# Patient Record
Sex: FEMALE | Race: WHITE | NOT HISPANIC OR LATINO | Employment: OTHER | URBAN - METROPOLITAN AREA
[De-identification: names, ages, dates, MRNs, and addresses within clinical notes are randomized per-mention and may not be internally consistent; named-entity substitution may affect disease eponyms.]

---

## 2024-01-01 ENCOUNTER — APPOINTMENT (OUTPATIENT)
Dept: CARDIOLOGY | Facility: HOSPITAL | Age: 74
DRG: 283 | End: 2024-01-01
Payer: MEDICARE

## 2024-01-01 ENCOUNTER — APPOINTMENT (OUTPATIENT)
Dept: RADIOLOGY | Facility: HOSPITAL | Age: 74
DRG: 283 | End: 2024-01-01
Payer: MEDICARE

## 2024-01-01 ENCOUNTER — APPOINTMENT (OUTPATIENT)
Dept: NEUROLOGY | Facility: HOSPITAL | Age: 74
DRG: 283 | End: 2024-01-01
Payer: MEDICARE

## 2024-01-01 PROCEDURE — 71045 X-RAY EXAM CHEST 1 VIEW: CPT

## 2024-01-01 PROCEDURE — 70498 CT ANGIOGRAPHY NECK: CPT

## 2024-01-01 PROCEDURE — 93306 TTE W/DOPPLER COMPLETE: CPT | Performed by: INTERNAL MEDICINE

## 2024-01-01 PROCEDURE — 93005 ELECTROCARDIOGRAM TRACING: CPT

## 2024-01-01 PROCEDURE — 95819 EEG AWAKE AND ASLEEP: CPT

## 2024-01-01 PROCEDURE — 93306 TTE W/DOPPLER COMPLETE: CPT

## 2024-06-03 ENCOUNTER — HOSPITAL ENCOUNTER (INPATIENT)
Facility: HOSPITAL | Age: 74
LOS: 2 days | Discharge: OTHER INSTITUTION | DRG: 280 | End: 2024-06-06
Attending: STUDENT IN AN ORGANIZED HEALTH CARE EDUCATION/TRAINING PROGRAM | Admitting: INTERNAL MEDICINE
Payer: MEDICARE

## 2024-06-03 ENCOUNTER — APPOINTMENT (OUTPATIENT)
Dept: RADIOLOGY | Facility: HOSPITAL | Age: 74
DRG: 280 | End: 2024-06-03
Payer: MEDICARE

## 2024-06-03 DIAGNOSIS — I21.3 ST ELEVATION MYOCARDIAL INFARCTION (STEMI), UNSPECIFIED ARTERY (MULTI): Primary | ICD-10-CM

## 2024-06-03 DIAGNOSIS — I21.3 STEMI (ST ELEVATION MYOCARDIAL INFARCTION) (MULTI): ICD-10-CM

## 2024-06-03 DIAGNOSIS — I21.29 ST ELEVATION (STEMI) MYOCARDIAL INFARCTION INVOLVING OTHER SITES (MULTI): ICD-10-CM

## 2024-06-03 LAB — GLUCOSE BLD MANUAL STRIP-MCNC: 171 MG/DL (ref 74–99)

## 2024-06-03 PROCEDURE — 70450 CT HEAD/BRAIN W/O DYE: CPT

## 2024-06-03 PROCEDURE — 93010 ELECTROCARDIOGRAM REPORT: CPT | Performed by: INTERNAL MEDICINE

## 2024-06-03 PROCEDURE — 82947 ASSAY GLUCOSE BLOOD QUANT: CPT

## 2024-06-03 PROCEDURE — 99291 CRITICAL CARE FIRST HOUR: CPT

## 2024-06-04 PROBLEM — I21.3 STEMI (ST ELEVATION MYOCARDIAL INFARCTION) (MULTI): Status: ACTIVE | Noted: 2024-06-04

## 2024-06-04 LAB
ALBUMIN SERPL-MCNC: 3.3 G/DL (ref 3.5–5)
ALBUMIN SERPL-MCNC: 3.4 G/DL (ref 3.5–5)
ALP BLD-CCNC: 100 U/L (ref 35–125)
ALP BLD-CCNC: 94 U/L (ref 35–125)
ALT SERPL-CCNC: 155 U/L (ref 5–40)
ALT SERPL-CCNC: 165 U/L (ref 5–40)
ANION GAP SERPL CALC-SCNC: 12 MMOL/L
ANION GAP SERPL CALC-SCNC: 15 MMOL/L
AORTIC VALVE MEAN GRADIENT: 3.5 MMHG
AORTIC VALVE PEAK VELOCITY: 1.27 M/S
APTT PPP: 24.5 SECONDS (ref 22–32.5)
AST SERPL-CCNC: 147 U/L (ref 5–40)
AST SERPL-CCNC: 159 U/L (ref 5–40)
ATRIAL RATE: 81 BPM
AV PEAK GRADIENT: 6.4 MMHG
AVA (PEAK VEL): 1.39 CM2
AVA (VTI): 1.57 CM2
BASOPHILS # BLD AUTO: 0.02 X10*3/UL (ref 0–0.1)
BASOPHILS # BLD AUTO: 0.03 X10*3/UL (ref 0–0.1)
BASOPHILS NFR BLD AUTO: 0.2 %
BASOPHILS NFR BLD AUTO: 0.3 %
BILIRUB SERPL-MCNC: 0.4 MG/DL (ref 0.1–1.2)
BILIRUB SERPL-MCNC: 0.5 MG/DL (ref 0.1–1.2)
BUN SERPL-MCNC: 36 MG/DL (ref 8–25)
BUN SERPL-MCNC: 39 MG/DL (ref 8–25)
CALCIUM SERPL-MCNC: 8.9 MG/DL (ref 8.5–10.4)
CALCIUM SERPL-MCNC: 9.1 MG/DL (ref 8.5–10.4)
CHLORIDE SERPL-SCNC: 104 MMOL/L (ref 97–107)
CHLORIDE SERPL-SCNC: 99 MMOL/L (ref 97–107)
CHOLEST SERPL-MCNC: 111 MG/DL (ref 133–200)
CHOLEST/HDLC SERPL: 2.9 {RATIO}
CO2 SERPL-SCNC: 22 MMOL/L (ref 24–31)
CO2 SERPL-SCNC: 24 MMOL/L (ref 24–31)
CREAT SERPL-MCNC: 0.9 MG/DL (ref 0.4–1.6)
CREAT SERPL-MCNC: 1.1 MG/DL (ref 0.4–1.6)
EGFRCR SERPLBLD CKD-EPI 2021: 52 ML/MIN/1.73M*2
EGFRCR SERPLBLD CKD-EPI 2021: 66 ML/MIN/1.73M*2
EJECTION FRACTION APICAL 4 CHAMBER: 54.1
EOSINOPHIL # BLD AUTO: 0.01 X10*3/UL (ref 0–0.4)
EOSINOPHIL # BLD AUTO: 0.05 X10*3/UL (ref 0–0.4)
EOSINOPHIL NFR BLD AUTO: 0.1 %
EOSINOPHIL NFR BLD AUTO: 0.5 %
ERYTHROCYTE [DISTWIDTH] IN BLOOD BY AUTOMATED COUNT: 15.7 % (ref 11.5–14.5)
ERYTHROCYTE [DISTWIDTH] IN BLOOD BY AUTOMATED COUNT: 16.1 % (ref 11.5–14.5)
EST. AVERAGE GLUCOSE BLD GHB EST-MCNC: 100 MG/DL
GLUCOSE BLD MANUAL STRIP-MCNC: 100 MG/DL (ref 74–99)
GLUCOSE BLD MANUAL STRIP-MCNC: 108 MG/DL (ref 74–99)
GLUCOSE BLD MANUAL STRIP-MCNC: 122 MG/DL (ref 74–99)
GLUCOSE BLD MANUAL STRIP-MCNC: 135 MG/DL (ref 74–99)
GLUCOSE SERPL-MCNC: 131 MG/DL (ref 65–99)
GLUCOSE SERPL-MCNC: 162 MG/DL (ref 65–99)
HBA1C MFR BLD: 5.1 %
HCT VFR BLD AUTO: 29.6 % (ref 36–46)
HCT VFR BLD AUTO: 32.1 % (ref 36–46)
HDLC SERPL-MCNC: 38 MG/DL
HGB BLD-MCNC: 10.1 G/DL (ref 12–16)
HGB BLD-MCNC: 9.5 G/DL (ref 12–16)
IMM GRANULOCYTES # BLD AUTO: 0.1 X10*3/UL (ref 0–0.5)
IMM GRANULOCYTES # BLD AUTO: 0.12 X10*3/UL (ref 0–0.5)
IMM GRANULOCYTES NFR BLD AUTO: 1 % (ref 0–0.9)
IMM GRANULOCYTES NFR BLD AUTO: 1.1 % (ref 0–0.9)
INR PPP: 1.1 (ref 0.9–1.2)
LDLC SERPL CALC-MCNC: 51 MG/DL (ref 65–130)
LEFT VENTRICLE INTERNAL DIMENSION DIASTOLE: 4.02 CM (ref 3.5–6)
LEFT VENTRICULAR OUTFLOW TRACT DIAMETER: 1.9 CM
LV EJECTION FRACTION BIPLANE: 45 %
LYMPHOCYTES # BLD AUTO: 0.36 X10*3/UL (ref 0.8–3)
LYMPHOCYTES # BLD AUTO: 0.65 X10*3/UL (ref 0.8–3)
LYMPHOCYTES NFR BLD AUTO: 3.2 %
LYMPHOCYTES NFR BLD AUTO: 6.6 %
MAGNESIUM SERPL-MCNC: 2.3 MG/DL (ref 1.6–3.1)
MCH RBC QN AUTO: 31.8 PG (ref 26–34)
MCH RBC QN AUTO: 32.5 PG (ref 26–34)
MCHC RBC AUTO-ENTMCNC: 31.5 G/DL (ref 32–36)
MCHC RBC AUTO-ENTMCNC: 32.1 G/DL (ref 32–36)
MCV RBC AUTO: 103 FL (ref 80–100)
MCV RBC AUTO: 99 FL (ref 80–100)
MITRAL VALVE E/A RATIO: 41.95
MONOCYTES # BLD AUTO: 0.67 X10*3/UL (ref 0.05–0.8)
MONOCYTES # BLD AUTO: 0.75 X10*3/UL (ref 0.05–0.8)
MONOCYTES NFR BLD AUTO: 5.9 %
MONOCYTES NFR BLD AUTO: 7.6 %
NEUTROPHILS # BLD AUTO: 10.2 X10*3/UL (ref 1.6–5.5)
NEUTROPHILS # BLD AUTO: 8.33 X10*3/UL (ref 1.6–5.5)
NEUTROPHILS NFR BLD AUTO: 84 %
NEUTROPHILS NFR BLD AUTO: 89.5 %
NRBC BLD-RTO: 0 /100 WBCS (ref 0–0)
NRBC BLD-RTO: 0.2 /100 WBCS (ref 0–0)
PHOSPHATE SERPL-MCNC: 3.7 MG/DL (ref 2.5–4.5)
PLATELET # BLD AUTO: 159 X10*3/UL (ref 150–450)
PLATELET # BLD AUTO: 176 X10*3/UL (ref 150–450)
POTASSIUM SERPL-SCNC: 3.6 MMOL/L (ref 3.4–5.1)
POTASSIUM SERPL-SCNC: 3.8 MMOL/L (ref 3.4–5.1)
PROT SERPL-MCNC: 5.7 G/DL (ref 5.9–7.9)
PROT SERPL-MCNC: 5.9 G/DL (ref 5.9–7.9)
PROTHROMBIN TIME: 11.9 SECONDS (ref 9.3–12.7)
Q ONSET: 204 MS
Q ONSET: 205 MS
QRS COUNT: 13 BEATS
QRS COUNT: 18 BEATS
QRS DURATION: 124 MS
QRS DURATION: 134 MS
QT INTERVAL: 376 MS
QT INTERVAL: 442 MS
QTC CALCULATION(BAZETT): 506 MS
QTC CALCULATION(BAZETT): 513 MS
QTC FREDERICIA: 458 MS
QTC FREDERICIA: 488 MS
R AXIS: 86 DEGREES
R AXIS: 89 DEGREES
RBC # BLD AUTO: 2.99 X10*6/UL (ref 4–5.2)
RBC # BLD AUTO: 3.11 X10*6/UL (ref 4–5.2)
RIGHT VENTRICLE PEAK SYSTOLIC PRESSURE: 30.7 MMHG
SODIUM SERPL-SCNC: 135 MMOL/L (ref 133–145)
SODIUM SERPL-SCNC: 141 MMOL/L (ref 133–145)
T AXIS: -88 DEGREES
T AXIS: -89 DEGREES
T OFFSET: 393 MS
T OFFSET: 425 MS
TRIGL SERPL-MCNC: 109 MG/DL (ref 40–150)
TROPONIN T SERPL-MCNC: 8151 NG/L
VENTRICULAR RATE: 109 BPM
VENTRICULAR RATE: 81 BPM
WBC # BLD AUTO: 11.4 X10*3/UL (ref 4.4–11.3)
WBC # BLD AUTO: 9.9 X10*3/UL (ref 4.4–11.3)

## 2024-06-04 PROCEDURE — 93454 CORONARY ARTERY ANGIO S&I: CPT | Performed by: INTERNAL MEDICINE

## 2024-06-04 PROCEDURE — C1769 GUIDE WIRE: HCPCS | Performed by: INTERNAL MEDICINE

## 2024-06-04 PROCEDURE — 96374 THER/PROPH/DIAG INJ IV PUSH: CPT | Mod: 59

## 2024-06-04 PROCEDURE — 2500000004 HC RX 250 GENERAL PHARMACY W/ HCPCS (ALT 636 FOR OP/ED): Performed by: INTERNAL MEDICINE

## 2024-06-04 PROCEDURE — 80053 COMPREHEN METABOLIC PANEL: CPT | Mod: 91,MUE

## 2024-06-04 PROCEDURE — 99291 CRITICAL CARE FIRST HOUR: CPT

## 2024-06-04 PROCEDURE — 84100 ASSAY OF PHOSPHORUS: CPT

## 2024-06-04 PROCEDURE — 99152 MOD SED SAME PHYS/QHP 5/>YRS: CPT | Performed by: INTERNAL MEDICINE

## 2024-06-04 PROCEDURE — 85730 THROMBOPLASTIN TIME PARTIAL: CPT | Performed by: STUDENT IN AN ORGANIZED HEALTH CARE EDUCATION/TRAINING PROGRAM

## 2024-06-04 PROCEDURE — 2500000004 HC RX 250 GENERAL PHARMACY W/ HCPCS (ALT 636 FOR OP/ED)

## 2024-06-04 PROCEDURE — 83036 HEMOGLOBIN GLYCOSYLATED A1C: CPT

## 2024-06-04 PROCEDURE — 2720000007 HC OR 272 NO HCPCS: Performed by: INTERNAL MEDICINE

## 2024-06-04 PROCEDURE — 70496 CT ANGIOGRAPHY HEAD: CPT | Performed by: STUDENT IN AN ORGANIZED HEALTH CARE EDUCATION/TRAINING PROGRAM

## 2024-06-04 PROCEDURE — 85610 PROTHROMBIN TIME: CPT | Performed by: STUDENT IN AN ORGANIZED HEALTH CARE EDUCATION/TRAINING PROGRAM

## 2024-06-04 PROCEDURE — 85025 COMPLETE CBC W/AUTO DIFF WBC: CPT | Mod: 91

## 2024-06-04 PROCEDURE — 71045 X-RAY EXAM CHEST 1 VIEW: CPT | Performed by: RADIOLOGY

## 2024-06-04 PROCEDURE — 93010 ELECTROCARDIOGRAM REPORT: CPT | Performed by: INTERNAL MEDICINE

## 2024-06-04 PROCEDURE — 85025 COMPLETE CBC W/AUTO DIFF WBC: CPT | Performed by: STUDENT IN AN ORGANIZED HEALTH CARE EDUCATION/TRAINING PROGRAM

## 2024-06-04 PROCEDURE — 99233 SBSQ HOSP IP/OBS HIGH 50: CPT

## 2024-06-04 PROCEDURE — 2500000004 HC RX 250 GENERAL PHARMACY W/ HCPCS (ALT 636 FOR OP/ED): Performed by: STUDENT IN AN ORGANIZED HEALTH CARE EDUCATION/TRAINING PROGRAM

## 2024-06-04 PROCEDURE — 92610 EVALUATE SWALLOWING FUNCTION: CPT | Mod: GN | Performed by: SPEECH-LANGUAGE PATHOLOGIST

## 2024-06-04 PROCEDURE — 36415 COLL VENOUS BLD VENIPUNCTURE: CPT

## 2024-06-04 PROCEDURE — 80061 LIPID PANEL: CPT

## 2024-06-04 PROCEDURE — 92523 SPEECH SOUND LANG COMPREHEN: CPT | Mod: GN | Performed by: SPEECH-LANGUAGE PATHOLOGIST

## 2024-06-04 PROCEDURE — 97162 PT EVAL MOD COMPLEX 30 MIN: CPT | Mod: GP

## 2024-06-04 PROCEDURE — 2500000001 HC RX 250 WO HCPCS SELF ADMINISTERED DRUGS (ALT 637 FOR MEDICARE OP): Performed by: STUDENT IN AN ORGANIZED HEALTH CARE EDUCATION/TRAINING PROGRAM

## 2024-06-04 PROCEDURE — 70498 CT ANGIOGRAPHY NECK: CPT | Performed by: STUDENT IN AN ORGANIZED HEALTH CARE EDUCATION/TRAINING PROGRAM

## 2024-06-04 PROCEDURE — 97110 THERAPEUTIC EXERCISES: CPT | Mod: GP

## 2024-06-04 PROCEDURE — 82947 ASSAY GLUCOSE BLOOD QUANT: CPT

## 2024-06-04 PROCEDURE — 84484 ASSAY OF TROPONIN QUANT: CPT | Performed by: STUDENT IN AN ORGANIZED HEALTH CARE EDUCATION/TRAINING PROGRAM

## 2024-06-04 PROCEDURE — 36415 COLL VENOUS BLD VENIPUNCTURE: CPT | Performed by: STUDENT IN AN ORGANIZED HEALTH CARE EDUCATION/TRAINING PROGRAM

## 2024-06-04 PROCEDURE — 80053 COMPREHEN METABOLIC PANEL: CPT | Performed by: STUDENT IN AN ORGANIZED HEALTH CARE EDUCATION/TRAINING PROGRAM

## 2024-06-04 PROCEDURE — 2550000001 HC RX 255 CONTRASTS: Performed by: INTERNAL MEDICINE

## 2024-06-04 PROCEDURE — 4A023N7 MEASUREMENT OF CARDIAC SAMPLING AND PRESSURE, LEFT HEART, PERCUTANEOUS APPROACH: ICD-10-PCS | Performed by: INTERNAL MEDICINE

## 2024-06-04 PROCEDURE — 82947 ASSAY GLUCOSE BLOOD QUANT: CPT | Mod: 91

## 2024-06-04 PROCEDURE — 2700000047 HC OR 270 NO HCPCS: Performed by: INTERNAL MEDICINE

## 2024-06-04 PROCEDURE — 2060000001 HC INTERMEDIATE ICU ROOM DAILY

## 2024-06-04 PROCEDURE — C1760 CLOSURE DEV, VASC: HCPCS | Performed by: INTERNAL MEDICINE

## 2024-06-04 PROCEDURE — 83735 ASSAY OF MAGNESIUM: CPT

## 2024-06-04 PROCEDURE — C1894 INTRO/SHEATH, NON-LASER: HCPCS | Performed by: INTERNAL MEDICINE

## 2024-06-04 PROCEDURE — 2500000005 HC RX 250 GENERAL PHARMACY W/O HCPCS: Performed by: INTERNAL MEDICINE

## 2024-06-04 PROCEDURE — 71045 X-RAY EXAM CHEST 1 VIEW: CPT | Performed by: STUDENT IN AN ORGANIZED HEALTH CARE EDUCATION/TRAINING PROGRAM

## 2024-06-04 PROCEDURE — 99221 1ST HOSP IP/OBS SF/LOW 40: CPT | Performed by: INTERNAL MEDICINE

## 2024-06-04 PROCEDURE — B2111ZZ FLUOROSCOPY OF MULTIPLE CORONARY ARTERIES USING LOW OSMOLAR CONTRAST: ICD-10-PCS | Performed by: INTERNAL MEDICINE

## 2024-06-04 PROCEDURE — 2550000001 HC RX 255 CONTRASTS: Performed by: STUDENT IN AN ORGANIZED HEALTH CARE EDUCATION/TRAINING PROGRAM

## 2024-06-04 RX ORDER — LABETALOL HYDROCHLORIDE 5 MG/ML
10 INJECTION, SOLUTION INTRAVENOUS EVERY 10 MIN PRN
Status: DISCONTINUED | OUTPATIENT
Start: 2024-06-04 | End: 2024-06-06

## 2024-06-04 RX ORDER — NAPROXEN SODIUM 220 MG/1
324 TABLET, FILM COATED ORAL ONCE
Status: DISCONTINUED | OUTPATIENT
Start: 2024-06-04 | End: 2024-06-04

## 2024-06-04 RX ORDER — NAPROXEN SODIUM 220 MG/1
81 TABLET, FILM COATED ORAL DAILY
Status: DISCONTINUED | OUTPATIENT
Start: 2024-06-04 | End: 2024-06-06

## 2024-06-04 RX ORDER — BENZONATATE 100 MG/1
100 CAPSULE ORAL EVERY 8 HOURS PRN
COMMUNITY
Start: 2023-11-30

## 2024-06-04 RX ORDER — DIAZEPAM 5 MG/ML
2 INJECTION, SOLUTION INTRAMUSCULAR; INTRAVENOUS EVERY 8 HOURS PRN
Status: COMPLETED | OUTPATIENT
Start: 2024-06-04 | End: 2024-06-05

## 2024-06-04 RX ORDER — HEPARIN SODIUM 5000 [USP'U]/ML
4000 INJECTION, SOLUTION INTRAVENOUS; SUBCUTANEOUS ONCE
Status: COMPLETED | OUTPATIENT
Start: 2024-06-04 | End: 2024-06-04

## 2024-06-04 RX ORDER — DEXTROSE 50 % IN WATER (D50W) INTRAVENOUS SYRINGE
25
Status: DISCONTINUED | OUTPATIENT
Start: 2024-06-04 | End: 2024-06-06

## 2024-06-04 RX ORDER — HYDRALAZINE HYDROCHLORIDE 20 MG/ML
10 INJECTION INTRAMUSCULAR; INTRAVENOUS
Status: DISCONTINUED | OUTPATIENT
Start: 2024-06-04 | End: 2024-06-06

## 2024-06-04 RX ORDER — LEVETIRACETAM 5 MG/ML
500 INJECTION INTRAVASCULAR EVERY 12 HOURS
Status: DISCONTINUED | OUTPATIENT
Start: 2024-06-04 | End: 2024-06-04

## 2024-06-04 RX ORDER — MIDAZOLAM HYDROCHLORIDE 1 MG/ML
INJECTION, SOLUTION INTRAMUSCULAR; INTRAVENOUS AS NEEDED
Status: DISCONTINUED | OUTPATIENT
Start: 2024-06-04 | End: 2024-06-04 | Stop reason: HOSPADM

## 2024-06-04 RX ORDER — IPRATROPIUM BROMIDE AND ALBUTEROL SULFATE 2.5; .5 MG/3ML; MG/3ML
3 SOLUTION RESPIRATORY (INHALATION) EVERY 6 HOURS PRN
COMMUNITY
Start: 2022-11-01

## 2024-06-04 RX ORDER — LEVETIRACETAM 5 MG/ML
500 INJECTION INTRAVASCULAR EVERY 12 HOURS
Status: DISCONTINUED | OUTPATIENT
Start: 2024-06-04 | End: 2024-06-07 | Stop reason: HOSPADM

## 2024-06-04 RX ORDER — IODIXANOL 320 MG/ML
INJECTION, SOLUTION INTRAVASCULAR AS NEEDED
Status: DISCONTINUED | OUTPATIENT
Start: 2024-06-04 | End: 2024-06-04 | Stop reason: HOSPADM

## 2024-06-04 RX ORDER — MIDAZOLAM HYDROCHLORIDE 1 MG/ML
0.5 INJECTION, SOLUTION INTRAMUSCULAR; INTRAVENOUS EVERY 8 HOURS PRN
Status: DISCONTINUED | OUTPATIENT
Start: 2024-06-04 | End: 2024-06-04

## 2024-06-04 RX ORDER — ENOXAPARIN SODIUM 100 MG/ML
40 INJECTION SUBCUTANEOUS EVERY 24 HOURS
Status: DISCONTINUED | OUTPATIENT
Start: 2024-06-04 | End: 2024-06-06

## 2024-06-04 RX ORDER — POLYETHYLENE GLYCOL 3350 17 G/17G
17 POWDER, FOR SOLUTION ORAL DAILY PRN
Status: DISCONTINUED | OUTPATIENT
Start: 2024-06-04 | End: 2024-06-06

## 2024-06-04 RX ORDER — LIDOCAINE HYDROCHLORIDE 10 MG/ML
INJECTION, SOLUTION EPIDURAL; INFILTRATION; INTRACAUDAL; PERINEURAL AS NEEDED
Status: DISCONTINUED | OUTPATIENT
Start: 2024-06-04 | End: 2024-06-04 | Stop reason: HOSPADM

## 2024-06-04 RX ORDER — MIDAZOLAM HYDROCHLORIDE 1 MG/ML
2 INJECTION, SOLUTION INTRAMUSCULAR; INTRAVENOUS ONCE
Status: COMPLETED | OUTPATIENT
Start: 2024-06-04 | End: 2024-06-04

## 2024-06-04 RX ORDER — AMLODIPINE BESYLATE 2.5 MG/1
2.5 TABLET ORAL DAILY
COMMUNITY
Start: 2024-01-30

## 2024-06-04 RX ORDER — POTASSIUM CHLORIDE 14.9 MG/ML
20 INJECTION INTRAVENOUS ONCE
Status: COMPLETED | OUTPATIENT
Start: 2024-06-04 | End: 2024-06-04

## 2024-06-04 RX ORDER — DEXTROSE 50 % IN WATER (D50W) INTRAVENOUS SYRINGE
12.5
Status: DISCONTINUED | OUTPATIENT
Start: 2024-06-04 | End: 2024-06-06

## 2024-06-04 RX ORDER — ATORVASTATIN CALCIUM 10 MG/1
10 TABLET, FILM COATED ORAL DAILY
COMMUNITY
Start: 2024-01-30

## 2024-06-04 RX ORDER — HYDRALAZINE HYDROCHLORIDE 25 MG/1
25 TABLET, FILM COATED ORAL EVERY 6 HOURS PRN
Status: DISCONTINUED | OUTPATIENT
Start: 2024-06-06 | End: 2024-06-06

## 2024-06-04 RX ORDER — ASPIRIN 300 MG/1
600 SUPPOSITORY RECTAL ONCE
Status: COMPLETED | OUTPATIENT
Start: 2024-06-04 | End: 2024-06-04

## 2024-06-04 RX ORDER — MIDAZOLAM HYDROCHLORIDE 1 MG/ML
2 INJECTION, SOLUTION INTRAMUSCULAR; INTRAVENOUS EVERY 6 HOURS PRN
Status: DISCONTINUED | OUTPATIENT
Start: 2024-06-04 | End: 2024-06-04

## 2024-06-04 RX ORDER — INSULIN LISPRO 100 [IU]/ML
0-5 INJECTION, SOLUTION INTRAVENOUS; SUBCUTANEOUS EVERY 4 HOURS
Status: DISCONTINUED | OUTPATIENT
Start: 2024-06-04 | End: 2024-06-06

## 2024-06-04 RX ORDER — GABAPENTIN 300 MG/1
300 CAPSULE ORAL 3 TIMES DAILY
COMMUNITY
Start: 2024-05-01 | End: 2025-04-26

## 2024-06-04 RX ORDER — NITROGLYCERIN 0.4 MG/1
0.4 TABLET SUBLINGUAL EVERY 5 MIN PRN
Status: DISCONTINUED | OUTPATIENT
Start: 2024-06-04 | End: 2024-06-06

## 2024-06-04 RX ADMIN — POTASSIUM CHLORIDE 20 MEQ: 14.9 INJECTION, SOLUTION INTRAVENOUS at 11:01

## 2024-06-04 RX ADMIN — ASPIRIN 600 MG: 300 SUPPOSITORY RECTAL at 01:07

## 2024-06-04 RX ADMIN — HEPARIN SODIUM 4000 UNITS: 5000 INJECTION, SOLUTION INTRAVENOUS; SUBCUTANEOUS at 01:04

## 2024-06-04 RX ADMIN — IOHEXOL 75 ML: 350 INJECTION, SOLUTION INTRAVENOUS at 00:08

## 2024-06-04 RX ADMIN — MIDAZOLAM HYDROCHLORIDE 2 MG: 1 INJECTION, SOLUTION INTRAMUSCULAR; INTRAVENOUS at 16:44

## 2024-06-04 RX ADMIN — MIDAZOLAM HYDROCHLORIDE 2 MG: 1 INJECTION, SOLUTION INTRAMUSCULAR; INTRAVENOUS at 13:18

## 2024-06-04 RX ADMIN — ENOXAPARIN SODIUM 40 MG: 40 INJECTION SUBCUTANEOUS at 11:04

## 2024-06-04 RX ADMIN — LEVETIRACETAM 500 MG: 5 INJECTION INTRAVENOUS at 21:52

## 2024-06-04 RX ADMIN — DIAZEPAM 2 MG: 10 INJECTION, SOLUTION INTRAMUSCULAR; INTRAVENOUS at 21:25

## 2024-06-04 SDOH — SOCIAL STABILITY: SOCIAL INSECURITY: ARE YOU OR HAVE YOU BEEN THREATENED OR ABUSED PHYSICALLY, EMOTIONALLY, OR SEXUALLY BY ANYONE?: UNABLE TO ASSESS

## 2024-06-04 SDOH — SOCIAL STABILITY: SOCIAL INSECURITY: WERE YOU ABLE TO COMPLETE ALL THE BEHAVIORAL HEALTH SCREENINGS?: NO

## 2024-06-04 SDOH — SOCIAL STABILITY: SOCIAL INSECURITY: DOES ANYONE TRY TO KEEP YOU FROM HAVING/CONTACTING OTHER FRIENDS OR DOING THINGS OUTSIDE YOUR HOME?: UNABLE TO ASSESS

## 2024-06-04 SDOH — SOCIAL STABILITY: SOCIAL INSECURITY: HAS ANYONE EVER THREATENED TO HURT YOUR FAMILY OR YOUR PETS?: UNABLE TO ASSESS

## 2024-06-04 SDOH — SOCIAL STABILITY: SOCIAL INSECURITY: DO YOU FEEL ANYONE HAS EXPLOITED OR TAKEN ADVANTAGE OF YOU FINANCIALLY OR OF YOUR PERSONAL PROPERTY?: UNABLE TO ASSESS

## 2024-06-04 SDOH — SOCIAL STABILITY: SOCIAL INSECURITY: DO YOU FEEL UNSAFE GOING BACK TO THE PLACE WHERE YOU ARE LIVING?: UNABLE TO ASSESS

## 2024-06-04 SDOH — SOCIAL STABILITY: SOCIAL INSECURITY: HAVE YOU HAD ANY THOUGHTS OF HARMING ANYONE ELSE?: UNABLE TO ASSESS

## 2024-06-04 SDOH — SOCIAL STABILITY: SOCIAL INSECURITY: ARE THERE ANY APPARENT SIGNS OF INJURIES/BEHAVIORS THAT COULD BE RELATED TO ABUSE/NEGLECT?: UNABLE TO ASSESS

## 2024-06-04 SDOH — SOCIAL STABILITY: SOCIAL INSECURITY: HAVE YOU HAD THOUGHTS OF HARMING ANYONE ELSE?: UNABLE TO ASSESS

## 2024-06-04 ASSESSMENT — PAIN - FUNCTIONAL ASSESSMENT
PAIN_FUNCTIONAL_ASSESSMENT: 0-10
PAIN_FUNCTIONAL_ASSESSMENT: 0-10
PAIN_FUNCTIONAL_ASSESSMENT: CPOT (CRITICAL CARE PAIN OBSERVATION TOOL)
PAIN_FUNCTIONAL_ASSESSMENT: WONG-BAKER FACES
PAIN_FUNCTIONAL_ASSESSMENT: 0-10
PAIN_FUNCTIONAL_ASSESSMENT: UNABLE TO SELF-REPORT
PAIN_FUNCTIONAL_ASSESSMENT: CPOT (CRITICAL CARE PAIN OBSERVATION TOOL)
PAIN_FUNCTIONAL_ASSESSMENT: 0-10
PAIN_FUNCTIONAL_ASSESSMENT: 0-10

## 2024-06-04 ASSESSMENT — LIFESTYLE VARIABLES
SUBSTANCE_ABUSE_PAST_12_MONTHS: NO
SKIP TO QUESTIONS 9-10: 0
HOW OFTEN DO YOU HAVE 6 OR MORE DRINKS ON ONE OCCASION: PATIENT UNABLE TO ANSWER
HOW MANY STANDARD DRINKS CONTAINING ALCOHOL DO YOU HAVE ON A TYPICAL DAY: PATIENT UNABLE TO ANSWER
AUDIT-C TOTAL SCORE: -1
HOW OFTEN DO YOU HAVE A DRINK CONTAINING ALCOHOL: PATIENT UNABLE TO ANSWER
PRESCIPTION_ABUSE_PAST_12_MONTHS: NO
AUDIT-C TOTAL SCORE: -1

## 2024-06-04 ASSESSMENT — COGNITIVE AND FUNCTIONAL STATUS - GENERAL
DRESSING REGULAR UPPER BODY CLOTHING: TOTAL
CLIMB 3 TO 5 STEPS WITH RAILING: TOTAL
MOVING TO AND FROM BED TO CHAIR: TOTAL
EATING MEALS: TOTAL
WALKING IN HOSPITAL ROOM: TOTAL
DAILY ACTIVITIY SCORE: 6
TURNING FROM BACK TO SIDE WHILE IN FLAT BAD: TOTAL
DAILY ACTIVITIY SCORE: 6
WALKING IN HOSPITAL ROOM: TOTAL
WALKING IN HOSPITAL ROOM: TOTAL
EATING MEALS: TOTAL
MOBILITY SCORE: 6
DRESSING REGULAR LOWER BODY CLOTHING: TOTAL
MOVING TO AND FROM BED TO CHAIR: TOTAL
DRESSING REGULAR LOWER BODY CLOTHING: TOTAL
CLIMB 3 TO 5 STEPS WITH RAILING: TOTAL
MOVING FROM LYING ON BACK TO SITTING ON SIDE OF FLAT BED WITH BEDRAILS: TOTAL
DAILY ACTIVITIY SCORE: 6
MOVING FROM LYING ON BACK TO SITTING ON SIDE OF FLAT BED WITH BEDRAILS: A LOT
MOBILITY SCORE: 6
HELP NEEDED FOR BATHING: TOTAL
STANDING UP FROM CHAIR USING ARMS: TOTAL
CLIMB 3 TO 5 STEPS WITH RAILING: TOTAL
MOVING TO AND FROM BED TO CHAIR: TOTAL
DRESSING REGULAR LOWER BODY CLOTHING: TOTAL
MOBILITY SCORE: 6
MOVING FROM LYING ON BACK TO SITTING ON SIDE OF FLAT BED WITH BEDRAILS: TOTAL
MOBILITY SCORE: 8
PERSONAL GROOMING: TOTAL
TOILETING: TOTAL
TOILETING: TOTAL
PATIENT BASELINE BEDBOUND: NO
WALKING IN HOSPITAL ROOM: TOTAL
DRESSING REGULAR UPPER BODY CLOTHING: TOTAL
EATING MEALS: TOTAL
STANDING UP FROM CHAIR USING ARMS: TOTAL
MOVING TO AND FROM BED TO CHAIR: TOTAL
HELP NEEDED FOR BATHING: TOTAL
TOILETING: TOTAL
CLIMB 3 TO 5 STEPS WITH RAILING: TOTAL
PERSONAL GROOMING: TOTAL
TURNING FROM BACK TO SIDE WHILE IN FLAT BAD: TOTAL
MOVING FROM LYING ON BACK TO SITTING ON SIDE OF FLAT BED WITH BEDRAILS: TOTAL
PERSONAL GROOMING: TOTAL
TURNING FROM BACK TO SIDE WHILE IN FLAT BAD: TOTAL
HELP NEEDED FOR BATHING: TOTAL
TURNING FROM BACK TO SIDE WHILE IN FLAT BAD: A LOT
DRESSING REGULAR UPPER BODY CLOTHING: TOTAL

## 2024-06-04 ASSESSMENT — PAIN SCALES - GENERAL
PAINLEVEL_OUTOF10: 0 - NO PAIN

## 2024-06-04 ASSESSMENT — ACTIVITIES OF DAILY LIVING (ADL)
HEARING - RIGHT EAR: UNABLE TO ASSESS
ADEQUATE_TO_COMPLETE_ADL: UNABLE TO ASSESS
PATIENT'S MEMORY ADEQUATE TO SAFELY COMPLETE DAILY ACTIVITIES?: UNABLE TO ASSESS
FEEDING YOURSELF: UNABLE TO ASSESS
TOILETING: UNABLE TO ASSESS
WALKS IN HOME: UNABLE TO ASSESS
DRESSING YOURSELF: UNABLE TO ASSESS
HEARING - LEFT EAR: UNABLE TO ASSESS
BATHING: UNABLE TO ASSESS
GROOMING: UNABLE TO ASSESS
LACK_OF_TRANSPORTATION: PATIENT UNABLE TO ANSWER
JUDGMENT_ADEQUATE_SAFELY_COMPLETE_DAILY_ACTIVITIES: UNABLE TO ASSESS
LACK_OF_TRANSPORTATION: PATIENT UNABLE TO ANSWER

## 2024-06-04 ASSESSMENT — PAIN DESCRIPTION - PROGRESSION: CLINICAL_PROGRESSION: NOT CHANGED

## 2024-06-04 ASSESSMENT — COLUMBIA-SUICIDE SEVERITY RATING SCALE - C-SSRS
2. HAVE YOU ACTUALLY HAD ANY THOUGHTS OF KILLING YOURSELF?: NO
6. HAVE YOU EVER DONE ANYTHING, STARTED TO DO ANYTHING, OR PREPARED TO DO ANYTHING TO END YOUR LIFE?: NO
1. IN THE PAST MONTH, HAVE YOU WISHED YOU WERE DEAD OR WISHED YOU COULD GO TO SLEEP AND NOT WAKE UP?: NO

## 2024-06-04 ASSESSMENT — PATIENT HEALTH QUESTIONNAIRE - PHQ9
2. FEELING DOWN, DEPRESSED OR HOPELESS: NOT AT ALL
1. LITTLE INTEREST OR PLEASURE IN DOING THINGS: NOT AT ALL
SUM OF ALL RESPONSES TO PHQ9 QUESTIONS 1 & 2: 0

## 2024-06-04 ASSESSMENT — PAIN SCALES - WONG BAKER
WONGBAKER_NUMERICALRESPONSE: NO HURT
WONGBAKER_NUMERICALRESPONSE: HURTS LITTLE MORE
WONGBAKER_NUMERICALRESPONSE: NO HURT

## 2024-06-04 NOTE — PROGRESS NOTES
Spiritual Care Visit    Clinical Encounter Type  Routine Visit: Introduction  Continue Visiting: Yes         Values/Beliefs  Spiritual Requests During Hospitalization: Anointed today    Sacramental Encounters  Sacrament of Sick-Anointing: Anointed     Eder Pendleton

## 2024-06-04 NOTE — PROGRESS NOTES
Speech-Language Pathology                 Therapy Communication Note    Patient Name: Karen Garcia  MRN: [id number]  Today's Date: 6/4/2024     Discipline: Speech Language Pathology    Missed Visit Reason:  Pt having echo completed. Will reattempt at later time pending pt status.     Missed Time: Attempt    Comment:

## 2024-06-04 NOTE — NURSING NOTE
Dr. Harding  and Dr. Thomas aware of patients rhythm, NSR with multifocal PVC'S, and Bigeminy. K is 3.8 New order for 20meq of K IV

## 2024-06-04 NOTE — PROGRESS NOTES
Speech-Language Pathology    SLP Adult Inpatient Speech-Language Cognition    Patient Name: Karen Garcia  MRN: [id number]  Today's Date: 6/4/2024   Time Calculation  Start Time: 1025  Stop Time: 1055  Time Calculation (min): 30 min   SPEV: 15 min      Current Problem:   1. STEMI (ST elevation myocardial infarction) (Multi)  Cardiac Catheterization Procedure    Cardiac Catheterization Procedure    Transthoracic Echo (TTE) Complete    Transthoracic Echo (TTE) Complete        SLP Assessment:  SLP Assessment  SLP Assessment Results: Expression deficits, Motor Speech Deficits, Receptive Comprehension deficits  Prognosis: Guarded  Medical Staff Made Aware: Yes      SLP Plan:  Plan  Inpatient/Swing Bed or Outpatient: Inpatient  SLP Frequency: 3x per week  Duration: Current admission  SLP Discharge Recommendations: Continue skilled SLP services at the next level of care  Next Treatment Priority: REASSESS  Discussed POC: Patient, Nursing  Discussed Risks/Benefits: Yes, Patient, Nursing  Patient/Caregiver Agreeable: Yes      Subjective   Current Problem:  Patient evaluated for acute change in communication status. Pt currently sitting upright in bed. Awake and alert. Appears to attempt to engage, and does appear to demonstrate some levels of receptive understanding.     Objective   Patient participated in the Quick Aphasia Battery with the following results: She did make efforts to participate and point as appropriate/able.   Summary Total score Total possible points        Word comprehension 0.00 10   Sentence comprehension 0.00 10   Word finding 0.00 10   Grammatical construction 0.00 10   Speech motor programming 0.00 10   Repetition 0.00 10   Reading 0.00 10   QAB overall 0.16 10       Goals:   LTG 1: Pt to improve expressive/receptive language and motor speech for improved communication for daily living tasks.   STG 1: Pt to participate in articulatory precision tasks at the single syllable level with max multimodal cues  and 25% acc.    Date initiated:  6/4/24 Target date: Current admission   Baseline: 5%   Status: Goal initiated   Progress this visit:  Goal initiated.   STG 2: Pt to match spoken/written word to object from a field of 2 with 25% acc  Date initiated:  6/4/24 Target date: Current admission   Baseline: 5%   Status: Goal initiated   Progress this visit:  Goal initiated.   STG 3: Pt to participate in automatic speech tasks with max multimodal cues and 20% acc.   Date initiated:  6/4/24 Target date: Current admission   Baseline: 5%   Status: Goal initiated   Progress this visit:  Goal initiated.     Pain:  Pain Assessment  Pain Assessment: Obrien-Baker FACES  Obrien-Baker FACES Pain Rating: Hurts little more      Cognition:   Unable to assess d/t severity of expressive/receptive deficits.        Motor Speech Production:  Motor Speech Production  Oral Motor : Impaired, Labial deviation R, Labial strength, Labial symmetry, Lingual ROM, Lingual symmetry, Lingual strength  Repetition: Impaired  Intelligibility: Impaired  Diadochokinetic Rate: Impaired  Motor Speech Disorder: Apraxia, Other (Comment) (Groping articulatory effort)      Auditory Comprehension:   Auditory Comprehension  Yes/No Questions: Other (Comment) (25% follow through)  Commands: Impaired  One Step Basic Commands: Impaired  Two Step Basic Commands: Unable to assess  Conversation: Impaired      Visual recognition:   Suspect R visual neglet      Reading Comprehension:  Reading Comprehension  Reading Status:  (Impaired)    Inpatient Education: Educated pt RE: results and recs of eval, but further review is necessary.

## 2024-06-04 NOTE — PROGRESS NOTES
Physical Therapy                 Therapy Communication Note    Patient Name: Karen Garcia  MRN: [id number]  Today's Date: 6/4/2024     Discipline: Physical Therapy    Missed Visit Reason: Missed Visit Reason: Other (Comment) (Attempted treatment;  RN reports defer this PM due to patient agitation and restlessness.)    Missed Time: Cancel

## 2024-06-04 NOTE — ED PROVIDER NOTES
HPI   Chief Complaint   Patient presents with    Stroke       HPI  See [facility]                  No data recorded                       Patient History   Past Medical History:   Diagnosis Date    COPD (chronic obstructive pulmonary disease) (Multi)      Past Surgical History:   Procedure Laterality Date    CARDIAC CATHETERIZATION N/A 6/4/2024    Procedure: Left Heart Cath, No LV;  Surgeon: Dane Harding DO;  Location: [facility] Cardiac Cath Lab;  Service: Cardiovascular;  Laterality: N/A;    CARDIAC CATHETERIZATION N/A 6/4/2024    Procedure: PCI;  Surgeon: Dane Harding DO;  Location: [facility] Cardiac Cath Lab;  Service: Cardiovascular;  Laterality: N/A;     No family history on file.  Social History     Tobacco Use    Smoking status: Never     Passive exposure: Never    Smokeless tobacco: Never   Vaping Use    Vaping status: Never Used   Substance Use Topics    Alcohol use: Never    Drug use: Never       Physical Exam   ED Triage Vitals   Temperature Heart Rate Respirations BP   06/04/24 0022 06/03/24 2345 06/04/24 0022 06/03/24 2345   37.1 °C (98.8 °F) (!) 110 18 (!) 134/109      Pulse Ox Temp Source Heart Rate Source Patient Position   06/04/24 0015 06/04/24 0022 -- --   99 % Oral        BP Location FiO2 (%)     -- --             Physical Exam  See [facility]  ED Course & [facility]   ED Course as of 06/09/24 0958   Tue Jun 04, 2024   0001 EKG presented to me at 12:01 AM     EKG as interpreted by me shows sinus rhythm at a ventricular rate of 109 bpm, questionable elevations in V1 and V2 but morphology is not consistent with acute STEMI, depressions noted and inferior leads and leads II, III and aVF.   []   0038 Yo []      ED Course User Index  [DH] Primo Hawkins MD         Diagnoses as of 06/09/24 0958   ST elevation myocardial infarction (STEMI), unspecified artery (Multi)       Medical Decision Making  74-year-old female with past medical history of COPD who presents emergency room with altered mental status.  According to family  patient had a episode of full body shaking, no prior history of seizure.  Reportedly patient has been having a great deal of nausea and vomiting and diarrhea in the last 2 days and is fairly dehydrated.  Patient at this time is not following commands and is responding appropriately but with slurred speech.  She otherwise denies any chest pain at this time but family notes some history of chest pain in the last few days.    ED Triage Vitals   Temperature Heart Rate Respirations BP   06/04/24 0022 06/03/24 2345 06/04/24 0022 06/03/24 2345   37.1 °C (98.8 °F) (!) 110 18 (!) 134/109      Pulse Ox Temp Source Heart Rate Source Patient Position   06/04/24 0015 06/04/24 0022 -- --   99 % Oral        BP Location FiO2 (%)     -- --             Vital signs reviewed: Afebrile, tachycardic, mildly hypertensive, 99% on room air    Exam     Constitutional: Uncomfortable appearing.   Head: Normocephalic, atraumatic.   Eyes: Pupils equal bilaterally, EOM grossly intact, conjunctiva normal.  Mouth/Throat: Oropharynx is clear, moist mucus membranes.   Neck: Supple. No lymphadenopathy.  Cardiovascular: Regular rate and regular rhythm. Extremities are well-perfused.   Pulmonary/Chest: No respiratory distress, breathing comfortably on room air.    Abdominal: Soft, non-tender, non-distended. No rebound or guarding.   Musculoskeletal: No lower extremity edema.       Skin: Warm, dry, and intact.   Neurological: Patient is oriented to person.  Mild right-sided facial droop, hearing intact to voice, speech slurred. Moves all extremities, slightly decreased in the right upper extremity.     Differential includes but is not limited to:  Postictal from seizure versus electrolyte abnormality versus stroke        Labs: ordered.  Labs Reviewed   CBC WITH AUTO DIFFERENTIAL - Abnormal       Result Value    WBC 9.9      nRBC 0.2 (*)     RBC 3.11 (*)     Hemoglobin 10.1 (*)     Hematocrit 32.1 (*)      (*)     MCH 32.5      MCHC 31.5 (*)      RDW 16.1 (*)     Platelets 176      Neutrophils % 84.0      Immature Granulocytes %, Automated 1.0 (*)     Lymphocytes % 6.6      Monocytes % 7.6      Eosinophils % 0.5      Basophils % 0.3      Neutrophils Absolute 8.33 (*)     Immature Granulocytes Absolute, Automated 0.10      Lymphocytes Absolute 0.65 (*)     Monocytes Absolute 0.75      Eosinophils Absolute 0.05      Basophils Absolute 0.03     COMPREHENSIVE METABOLIC PANEL - Abnormal    Glucose 162 (*)     Sodium 135      Potassium 3.6      Chloride 99      Bicarbonate 24      Urea Nitrogen 39 (*)     Creatinine 1.10      eGFR 52 (*)     Calcium 9.1      Albumin 3.4 (*)     Alkaline Phosphatase 100      Total Protein 5.9       (*)     Bilirubin, Total 0.4       (*)     Anion Gap 12     TROPONIN T, HIGH SENSITIVITY - Abnormal    Troponin T, High Sensitivity 8,151 (*)    CBC WITH AUTO DIFFERENTIAL - Abnormal    WBC 11.4 (*)     nRBC 0.0      RBC 2.99 (*)     Hemoglobin 9.5 (*)     Hematocrit 29.6 (*)     MCV 99      MCH 31.8      MCHC 32.1      RDW 15.7 (*)     Platelets 159      Neutrophils % 89.5      Immature Granulocytes %, Automated 1.1 (*)     Lymphocytes % 3.2      Monocytes % 5.9      Eosinophils % 0.1      Basophils % 0.2      Neutrophils Absolute 10.20 (*)     Immature Granulocytes Absolute, Automated 0.12      Lymphocytes Absolute 0.36 (*)     Monocytes Absolute 0.67      Eosinophils Absolute 0.01      Basophils Absolute 0.02     COMPREHENSIVE METABOLIC PANEL - Abnormal    Glucose 131 (*)     Sodium 141      Potassium 3.8      Chloride 104      Bicarbonate 22 (*)     Urea Nitrogen 36 (*)     Creatinine 0.90      eGFR 66      Calcium 8.9      Albumin 3.3 (*)     Alkaline Phosphatase 94      Total Protein 5.7 (*)      (*)     Bilirubin, Total 0.5       (*)     Anion Gap 15     LIPID PANEL - Abnormal    Cholesterol 111 (*)     HDL-Cholesterol 38.0 (*)     Cholesterol/HDL Ratio 2.9      LDL Calculated 51 (*)     Triglycerides  109     CBC WITH AUTO DIFFERENTIAL - Abnormal    WBC 12.7 (*)     nRBC 0.3 (*)     RBC 3.13 (*)     Hemoglobin 10.1 (*)     Hematocrit 33.3 (*)      (*)     MCH 32.3      MCHC 30.3 (*)     RDW 15.8 (*)     Platelets 168      Neutrophils % 87.6      Immature Granulocytes %, Automated 0.9      Lymphocytes % 3.7      Monocytes % 7.4      Eosinophils % 0.1      Basophils % 0.3      Neutrophils Absolute 11.08 (*)     Immature Granulocytes Absolute, Automated 0.12      Lymphocytes Absolute 0.47 (*)     Monocytes Absolute 0.93 (*)     Eosinophils Absolute 0.01      Basophils Absolute 0.04     COMPREHENSIVE METABOLIC PANEL - Abnormal    Glucose 125 (*)     Sodium 142      Potassium 4.7      Chloride 109 (*)     Bicarbonate 20 (*)     Urea Nitrogen 40 (*)     Creatinine 0.80      eGFR 76      Calcium 9.5      Albumin 3.2 (*)     Alkaline Phosphatase 114      Total Protein 5.9       (*)     Bilirubin, Total 0.6       (*)     Anion Gap 13     PHOSPHORUS - Abnormal    Phosphorus 4.7 (*)    URIC ACID - Abnormal    Uric Acid 11.1 (*)    PHOSPHORUS - Abnormal    Phosphorus 4.9 (*)    CBC WITH AUTO DIFFERENTIAL - Abnormal    WBC 14.9 (*)     nRBC 1.5 (*)     RBC 3.24 (*)     Hemoglobin 10.4 (*)     Hematocrit 32.6 (*)      (*)     MCH 32.1      MCHC 31.9 (*)     RDW 15.9 (*)     Platelets 181      Neutrophils % 89.2      Immature Granulocytes %, Automated 1.7 (*)     Lymphocytes % 2.7      Monocytes % 6.1      Eosinophils % 0.0      Basophils % 0.3      Neutrophils Absolute 13.32 (*)     Immature Granulocytes Absolute, Automated 0.25      Lymphocytes Absolute 0.41 (*)     Monocytes Absolute 0.91 (*)     Eosinophils Absolute 0.00      Basophils Absolute 0.05     BASIC METABOLIC PANEL - Abnormal    Glucose 116 (*)     Sodium 149 (*)     Potassium 4.7      Chloride 110 (*)     Bicarbonate 22 (*)     Urea Nitrogen 56 (*)     Creatinine 0.90      eGFR 66      Calcium 9.7      Anion Gap 17     POCT GLUCOSE -  Abnormal    POCT Glucose 171 (*)    POCT GLUCOSE - Abnormal    POCT Glucose 135 (*)    POCT GLUCOSE - Abnormal    POCT Glucose 122 (*)    POCT GLUCOSE - Abnormal    POCT Glucose 100 (*)    POCT GLUCOSE - Abnormal    POCT Glucose 108 (*)    POCT GLUCOSE - Abnormal    POCT Glucose 124 (*)    POCT GLUCOSE - Abnormal    POCT Glucose 113 (*)    POCT GLUCOSE - Abnormal    POCT Glucose 112 (*)    POCT GLUCOSE - Abnormal    POCT Glucose 124 (*)    POCT GLUCOSE - Abnormal    POCT Glucose 109 (*)    POCT GLUCOSE - Abnormal    POCT Glucose 117 (*)    POCT GLUCOSE - Abnormal    POCT Glucose 116 (*)    POCT GLUCOSE - Abnormal    POCT Glucose 121 (*)    POCT GLUCOSE - Abnormal    POCT Glucose 123 (*)    POCT GLUCOSE - Abnormal    POCT Glucose 126 (*)    POCT GLUCOSE - Abnormal    POCT Glucose 119 (*)    PROTIME-INR - Normal    Protime 11.9      INR 1.1      Narrative:     INR Therapeutic Range: 2.0-3.5   APTT - Normal    aPTT 24.5     MAGNESIUM - Normal    Magnesium 2.30     PHOSPHORUS - Normal    Phosphorus 3.7     MAGNESIUM - Normal    Magnesium 2.60     MAGNESIUM - Normal    Magnesium 2.70     VITAMIN D 25-HYDROXY,TOTAL - Normal    Vitamin D, 25-Hydroxy, Total 54     HEMOGLOBIN A1C    Hemoglobin A1C 5.1      Estimated Average Glucose 100      Narrative:     Diagnosis of Diabetes-Adults  Non-Diabetic: < or = 5.6%  Increased risk for developing diabetes: 5.7-6.4%  Diagnostic of diabetes: > or = 6.5%    Monitoring of Diabetes  Age (y)....................... Therapeutic Goal (%)  Adults: >18.........................<7.0  Pediatrics: 13-18...................<7.5  Pediatrics: 7-12....................<8.0  Pediatrics: 0-6..................... 7.5-8.5    American Diabetes Association. Diabetes Care 33(S1), Jan 2010       POCT GLUCOSE METER   POCT GLUCOSE METER   POCT GLUCOSE METER   POCT GLUCOSE METER   POCT GLUCOSE METER   POCT GLUCOSE METER   POCT GLUCOSE METER   POCT GLUCOSE METER   POCT GLUCOSE METER   POCT GLUCOSE METER   POCT  GLUCOSE METER   POCT GLUCOSE METER   POCT GLUCOSE METER   POCT GLUCOSE METER       Radiology: ordered and independent interpretation performed.  CT Scan(s) CT head is interpreted by me shows no large intracranial hemorrhage.    ECG/medicine tests: ordered and independent interpretation performed.  ED Course as of 06/09/24 0958 Tue Jun 04, 2024   0001 EKG presented to me at 12:01 AM     EKG as interpreted by me shows sinus rhythm at a ventricular rate of 109 bpm, questionable elevations in V1 and V2 but morphology is not consistent with acute STEMI, depressions noted and inferior leads and leads II, III and aVF.   [DH]   0038 Yo []      ED Course User Index  [DH] Primo Hawkins MD         Diagnoses as of 06/09/24 0958   ST elevation myocardial infarction (STEMI), unspecified artery (Multi)     Patient seen on presentation and appears to be altered, is able to answer some questions but still seems confused and with slurred speech with a baseline of alert and oriented x 3 with no slurred speech.  Initiated stroke alert at this time and taken promptly to CT scan.  EKG does appear abnormal, no comparison at this time but patient is difficult historian given altered mental status.  Approximate NIH stroke scale of 3.    CT head discussed with radiologist and there is concern for hyperdense M1 M2 vessels, findings discussed with stroke neurologist, Dr. Ram, who does not recommend TNK at this time.  Troponin resulting at this time at 8000, STEMI alerted at this time, spoke to Dr. Harding who will come in for evaluation.  Patient does continue to have some improving mental status.  Patient unable to tolerate orally, will be given rectal aspirin and started on heparin.    Spoke to Dr. Harding who will agree to perform cardiac cath at this time for concern for STEMI.    Critical Care Procedure Note    Authorized and Performed by: Primo Hawkins MD  Total critical care time: 31 minutes  Due to a high probability of clinically  significant, life threatening deterioration, the patient required my highest level of preparedness to intervene emergently and I personally spent this critical care time directly and personally managing the patient. This critical care time included obtaining a history; examining the patient; pulse oximetry; ordering and review of studies; arranging urgent treatment with development of a management plan; evaluation of patient's response to treatment; frequent reassessment; and, discussions with other providers.  This critical care time was performed to assess and manage the high probability of imminent, life-threatening deterioration that could result in multi-organ failure. It was exclusive of separately billable procedures and treating other patients and teaching time.    Please see MDM section and the rest of the note for further information on patient assessment and treatment.    ED Medications managed:    Medications   iohexol (OMNIPaque) 350 mg iodine/mL solution 75 mL (75 mL intravenous Given 6/4/24 0008)   aspirin suppository 600 mg (600 mg rectal Given 6/4/24 0107)   heparin (porcine) injection 4,000 Units (4,000 Units intravenous Given 6/4/24 0104)   potassium chloride 20 mEq in 100 mL IV premix (0 mEq intravenous Stopped 6/4/24 1301)   midazolam (Versed) injection 2 mg (2 mg intravenous Given 6/4/24 1318)   diazePAM (Valium) injection 2 mg (2 mg intravenous Given 6/5/24 1018)   diazePAM (Valium) injection 2 mg (2 mg intravenous Given 6/5/24 0354)   diazePAM (Valium) injection 2 mg (2 mg intravenous Given 6/6/24 0028)   haloperidol lactate (Haldol) injection 0.5 mg (0.5 mg intramuscular Given 6/6/24 0303)   amiodarone (Nexterone) 150 mg in dextrose,iso-osm 100 mL (1.5 mg/mL) IV (premix) (0 mg intravenous Stopped 6/6/24 1319)   morphine injection 2 mg (2 mg intravenous Given 6/6/24 1650)           Primo Hawkins MD  9:58 AM    Attending Emergency Physician  [facility]            Procedure  Procedures     Primo Hawkins MD  06/04/24 1809       Primo Hawkins MD  06/09/24 0952

## 2024-06-04 NOTE — H&P
[facility] Critical Care Medicine       Date:  6/4/2024  Patient:  Karen Garcia  YOB: 1950  MRN:  [id number]   Admit Date:  6/3/2024      Chief Complaint   Patient presents with    Stroke         History of Present Illness:  Karen Garcia is a 74 y.o. year old female patient with Past Medical History of  subarachnoid hemorrhage s/p repair of L MCA and L PCA in 1996, COPD, lung cancer (last cycle of immunotherapy 5/20/24), and HTN presented to the ED via EMS for altered mental status and concern of seizure like activity witnessed by family.  Prior to today, pt was having nausea/vomiting and diarrhea for 2 days and seemed to be dehydrated per family.  On EMS arrival pt was only responsive to pain.  EKG done by EMS was of concern for a STEMI.     On arrival to the ED, pt was aphasic and appeared to have a right sided facial droop.  Initial vitals include temp 37.1, pulse 110, resp 18, /109, SpO2 95% on RA.  CMP with glucose 163, albumin 3.4, ALT//159.  CBC with WBC of 9.9 and H/H 10.1/32.1. CT head with hyperdense left M2/M3 vessel suspicious for an acute intraluminal thrombus, concern for early loss of gray-white differentiation involving the left frontal lobe reflecting acute ischemic infarct.  CT amgio head/neck with left M1 segment is patent. There is an occluded M2 left MCA branch as well as numerous left M3 branches in both superior and inferior divisions, both demonstrating a paucity of opacification when compared to the right side throughout the M4 segments, particularly in the inferior division. Complete occlusion of the left ICA from the bulb to the ophthalmic segment with reconstitution and normalization of caliber at the communicating segment. This is age indeterminate.  Telestroke consulted, and pt is not a candidate for tPA or endovascular treatment.  Pt was given 1x dose IV heparin and aspirin suppository in the ED.  Pt was then taken to cardiac catheterization by cardiologist  Dr. Harding.      Cardiac catheterization done due to anterior ST elevation noted on the EKG.  Mild non-obstructive CAD was noted, with no stent placement required.  Pt was brought to the ICU post-op for continued management.      Interval ICU Events:  6/4: Pt admitted to the ICU post-cardiac catheterization for stroke management.  Telestroke consult, IV tPA is not recommended and pt is not a candidate for endovascular treatment.  Continue q1h neurochecks per protocol.      Medical History:  No past medical history on file.  No past surgical history on file.  No medications prior to admission.     Patient has no known allergies.     No family history on file.    Review of Systems:  14 point review of systems was completed and negative except for those specially mention in my HPI    Physical Exam:    Physical Exam  Constitutional:       General: She is in acute distress.   HENT:      Mouth/Throat:      Mouth: Mucous membranes are dry.      Pharynx: Oropharynx is clear.   Eyes:      Pupils: Pupils are equal, round, and reactive to light.   Cardiovascular:      Rate and Rhythm: Normal rate. Rhythm irregular.      Pulses: Normal pulses.      Heart sounds: Normal heart sounds.   Pulmonary:      Effort: Pulmonary effort is normal. No respiratory distress.      Breath sounds: Normal breath sounds. No wheezing or rhonchi.   Abdominal:      General: Bowel sounds are normal. There is no distension.      Palpations: Abdomen is soft.      Tenderness: There is no abdominal tenderness.   Musculoskeletal:      Cervical back: Normal range of motion.      Right lower leg: Normal. No swelling. No edema.      Left lower leg: Normal. No swelling. No edema.      Comments: Pt unable to move right arm and leg at this time.     Skin:     Capillary Refill: Capillary refill takes less than 2 seconds.   Neurological:      Mental Status: She is alert.      Motor: Weakness present.      Comments: Pt unable to move right upper and lower  extremity.  Tongue deviates to the right side.          Vitals:  Most recent :  Vitals:    06/04/24 0229   BP: 135/76   Pulse: 93   Resp: 20   Temp:    SpO2: 97%       24hr Min/Max:  Temp  Min: 37.1 °C (98.8 °F)  Max: 37.1 °C (98.8 °F)  Pulse  Min: 79  Max: 110  BP  Min: 124/66  Max: 149/74  Resp  Min: 18  Max: 20  SpO2  Min: 92 %  Max: 99 %    Objective:    Scheduled Medications:       Continuous Medications:       PRN Medications:  PRN medications: nitroglycerin    Labs/Radiology/Diagnostic Review:  Results for orders placed or performed during the hospital encounter of 06/03/24 (from the past 24 hour(s))   POCT GLUCOSE   Result Value Ref Range    POCT Glucose 171 (H) 74 - 99 mg/dL   CBC and Auto Differential   Result Value Ref Range    WBC 9.9 4.4 - 11.3 x10*3/uL    nRBC 0.2 (H) 0.0 - 0.0 /100 WBCs    RBC 3.11 (L) 4.00 - 5.20 x10*6/uL    Hemoglobin 10.1 (L) 12.0 - 16.0 g/dL    Hematocrit 32.1 (L) 36.0 - 46.0 %     (H) 80 - 100 fL    MCH 32.5 26.0 - 34.0 pg    MCHC 31.5 (L) 32.0 - 36.0 g/dL    RDW 16.1 (H) 11.5 - 14.5 %    Platelets 176 150 - 450 x10*3/uL    Neutrophils % 84.0 40.0 - 80.0 %    Immature Granulocytes %, Automated 1.0 (H) 0.0 - 0.9 %    Lymphocytes % 6.6 13.0 - 44.0 %    Monocytes % 7.6 2.0 - 10.0 %    Eosinophils % 0.5 0.0 - 6.0 %    Basophils % 0.3 0.0 - 2.0 %    Neutrophils Absolute 8.33 (H) 1.60 - 5.50 x10*3/uL    Immature Granulocytes Absolute, Automated 0.10 0.00 - 0.50 x10*3/uL    Lymphocytes Absolute 0.65 (L) 0.80 - 3.00 x10*3/uL    Monocytes Absolute 0.75 0.05 - 0.80 x10*3/uL    Eosinophils Absolute 0.05 0.00 - 0.40 x10*3/uL    Basophils Absolute 0.03 0.00 - 0.10 x10*3/uL   Comprehensive metabolic panel   Result Value Ref Range    Glucose 162 (H) 65 - 99 mg/dL    Sodium 135 133 - 145 mmol/L    Potassium 3.6 3.4 - 5.1 mmol/L    Chloride 99 97 - 107 mmol/L    Bicarbonate 24 24 - 31 mmol/L    Urea Nitrogen 39 (H) 8 - 25 mg/dL    Creatinine 1.10 0.40 - 1.60 mg/dL    eGFR 52 (L) >60  mL/min/1.73m*2    Calcium 9.1 8.5 - 10.4 mg/dL    Albumin 3.4 (L) 3.5 - 5.0 g/dL    Alkaline Phosphatase 100 35 - 125 U/L    Total Protein 5.9 5.9 - 7.9 g/dL     (H) 5 - 40 U/L    Bilirubin, Total 0.4 0.1 - 1.2 mg/dL     (H) 5 - 40 U/L    Anion Gap 12 <=19 mmol/L   Troponin T, High Sensitivity   Result Value Ref Range    Troponin T, High Sensitivity 8,151 (HH) <=14 ng/L   Protime-INR   Result Value Ref Range    Protime 11.9 9.3 - 12.7 seconds    INR 1.1 0.9 - 1.2   APTT   Result Value Ref Range    aPTT 24.5 22.0 - 32.5 seconds   POCT GLUCOSE   Result Value Ref Range    POCT Glucose 135 (H) 74 - 99 mg/dL       XR chest 1 view    Result Date: 6/4/2024  Interpreted By:  Luis Merida, STUDY: XR CHEST 1 VIEW;  6/4/2024 1:01 am   INDICATION: Signs/Symptoms:co.   COMPARISON: 12/25/2022   ACCESSION NUMBER(S): [id number]   ORDERING CLINICIAN: KP ZAMARRIPA   FINDINGS: There is a focal masslike consolidation infiltrate in the right midlung measuring 4.2 cm x 5.5 cm. This demonstrates central lucency. This is surrounded by more ill-defined airspace disease. The left lung is clear. There is a left chest port terminating over the right atrium. The heart is enlarged. Aorta is ectatic. No pneumothorax.       Focal masslike opacity projecting of the right midlung measuring 5.5 x 4.4 cm with a central lucency consistent with known lung cancer.     MACRO: None.   Signed by: Luis Merida 6/4/2024 1:23 AM Dictation workstation:   [id number]    CT brain attack angio head and neck W and WO IV contrast    Result Date: 6/4/2024  Interpreted By:  Luis Merida, STUDY: CT BRAIN ATTACK ANGIO HEAD AND NECK W AND WO IV CONTRAST;  6/4/2024 12:33 am   INDICATION: Signs/Symptoms:droop, ams   ACCESSION NUMBER(S): [id number]   ORDERING CLINICIAN: KP ZAMARRIPA   TECHNIQUE: Following IV contrast administration of 75 ML iodinated contrast, a CT angiography of the head and neck was performed. MIPS and 3D reconstructions of the  [place] and neck were created on an independent workstation and reviewed.   FINDINGS: Emphysema at the lung apices. Moderate size right pleural effusion.     CTA NECK:       LEFT VERTEBRAL ARTERY: Mild atherosclerosis at the origin. No hemodynamically significant stenosis, occlusion, or dissection.   LEFT COMMON/INTERNAL CAROTID ARTERY: There is at least mild stenosis of the left common carotid artery origin, evaluation limited due to motion artifact. The remainder of the left common carotid artery is widely patent. Just distal to the left carotid bulb there is complete occlusion of the entire extracranial left ICA extending to the intracranial the ICA.   RIGHT VERTEBRAL ARTERY: Non dominant. No hemodynamically significant stenosis, occlusion, or dissection.   RIGHT COMMON/INTERNAL CAROTID ARTERY: Atherosclerosis at the bulb and proximal postbulbar ICA. No hemodynamically significant stenosis, occlusion, or dissection.     The neck soft tissues show no evidence of mass, fluid collection, or enlarged lymph nodes. There is no acute osseous abnormality.       CTA HEAD:   ANTERIOR CIRCULATION:   - Internal Carotid Arteries: Complete occlusion of the LEFT petrous, cavernous, clinoid, and ophthalmic ICA segments. Reconstitution of flow and normal caliber at the left communicating segment. 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 Intracranial right ICA widely patent with minimal calcific atherosclerosis.   - Middle Cerebral Arteries: Aneurysm clip at the left MCA bifurcation. The left M1 segment is patent. There is an occluded M2 left MCA branch as well as numerous left M3 branches in both superior and inferior divisions, both demonstrating a paucity of opacification when compared to the right side throughout the M 4 segments. On the right, no hemodynamically significant stenosis or occlusion.   - Anterior Cerebral Arteries:  No hemodynamically significant stenosis or occlusion.     POSTERIOR CIRCULATION:   -  Intracranial Vertebral Arteries:  No hemodynamically significant stenosis or occlusion.   - Basilar Artery:  No hemodynamically significant stenosis or occlusion.   - Posterior Cerebral Arteries:  No hemodynamically significant stenosis or occlusion.   No arteriovenous malformation is visualized. No pathologic intracranial enhancement or discrete mass. The dural venous sinuses are patent.   MIPS and 3D reconstructions confirm the above findings.       1. Left M1 segment is patent. There is an occluded M2 left MCA branch as well as numerous left M3 branches in both superior and inferior divisions, both demonstrating a paucity of opacification when compared to the right side throughout the M4 segments, particularly in the inferior division.   2. Complete occlusion of the left ICA from the bulb to the ophthalmic segment with reconstitution and normalization of caliber at the communicating segment. This is age indeterminate.   3. No evidence of intracranial aneurysm. 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   MACRO: Luis Merida discussed the significance and urgency of this critical finding by telephone with  KP ZAMARRIPA on 6/4/2024 at 12:25 a.m.. (**-RCF-**) Findings:  See findings.   Signed by: Luis Merida 6/4/2024 1:22 AM Dictation workstation:   [id number]    CT brain attack head wo IV contrast    Result Date: 6/4/2024  Interpreted By:  Luis Merida, STUDY: CT BRAIN ATTACK HEAD WO IV CONTRAST;  6/4/2024 12:05 am   INDICATION: Signs/Symptoms:Stroke Evaluation.   COMPARISON: None.   ACCESSION NUMBER(S): [id number]   ORDERING CLINICIAN: KP ZAMARRIPA   TECHNIQUE: Noncontrast axial CT images of head were obtained with coronal and sagittal reconstructed images.   FINDINGS: BRAIN PARENCHYMA: Evaluation is limited due to extensive motion artifact and beam hardening artifact from postsurgical changes in the left middle cranial fossa and suprasellar cistern. There is  subtle hypoattenuation in the left frontal lobe involving the cortical gray white junction raising concern for early ischemic change. There is chronic gliotic changes within the left basal ganglia, left subinsular white matter, and subcortical white matter of left parietal lobe. There is no acute intracranial hemorrhage or mass effect.   VENTRICLES and EXTRA-AXIAL SPACES: There is a hyperdense artery sign in the left sylvian fissure representing an M2/M3 vessel. Aneurysm clips adjacent to the anterior left clinoid process and in the left sylvian fissure are noted. No evidence of acute extra-axial hemorrhage. The ventricles are normal in caliber.   PARANASAL SINUSES/MASTOIDS:  No hemorrhage or air-fluid levels within the visualized paranasal sinuses. The mastoids are well aerated.   CALVARIUM/ORBITS: Left frontotemporal craniotomy. No skull fracture. The orbits and globes are intact to the extent visualized.   EXTRACRANIAL SOFT TISSUES: No discernible abnormality.       1. Hyperdense left M2/M3 vessel suspicious for an acute intraluminal thrombus. Concern for early loss of gray-white differentiation involving the left frontal lobe reflecting acute ischemic infarct.   2. No acute intracranial hemorrhage.   3. 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   4. Gliotic changes in the subinsular white matter and left basal ganglia.   MACRO: Luis Merida discussed the significance and urgency of this critical finding by CALE VALLES with  KP ZAMARRIPA on 6/4/2024 at 12:29 am with confirmation of receipt .  (**-RCF-**) Findings:  See findings.   Signed by: Luis Merida 6/4/2024 12:32 AM Dictation workstation:   [id number]    I/O:    Intake/Output Summary (Last 24 hours) at 6/4/2024 0330  Last data filed at 6/4/2024 0250  Gross per 24 hour   Intake --   Output 5 ml   Net -5 ml       I have reviewed all medications, laboratory results, and imaging pertinent for today's  encounter    Assessment/Plan:    I am currently managing this critically ill patient for the following problems:    Neuro/Psych/Pain Ctrl/Sedation:  Ischemic stroke   Hx of subarachnoid hemorrhage s/p repair in 1996  CT head:    1. Hyperdense left M2/M3 vessel suspicious for an acute intraluminal thrombus. Concern for early loss of gray-white differentiation involving the left frontal lobe reflecting acute ischemic infarct. 2. No acute intracranial hemorrhage.  3. 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    4. Gliotic changes in the subinsular white matter and left basal ganglia.  CT angio head/neck:   1. Left M1 segment is patent. There is an occluded M2 left MCA branch as well as numerous left M3 branches in both superior and inferior divisions, both demonstrating a paucity of opacification when compared to the right side throughout the M4 segments, particularly in the inferior division.  2. Complete occlusion of the left ICA from the bulb to the ophthalmic segment with reconstitution and normalization of caliber at the communicating segment. This is age indeterminate.  3. No evidence of intracranial aneurysm. 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  -Continue neuro checks q1h  -Telestroke consult, not a candidate for tPA or endovascular treatment   -Neurology consulted, pending recs   -EEG ordered, pending   -Hold home gabapentin while NPO  -CAM ICU score q-shift  -Sleep/wake cycle hygiene  -Delirium precaution    Respiratory/ENT:  Hx of COPD  Hx of lung CA  -Currently on RA  -Continuous pulse oximetry, maintain SpO2 >90%  -Duonebs q6h prn for wheezing/shortness of breath   -Hold home tesslon perles   -Aggressive pulmonary/bronchial hygiene     Cardiovascular:  ST elevation, elevated troponin   Hx of HTN   -Hold home amlodipine   -Hold home atorvastatin   -Cardiac catheterization done 6/4, no significant findings    -Cardiology on consult  -ECHO planned for 6/4, pending results   -Continuous cardiac monitoring per unit protocol     GI:  -Diet: NPO  -SLP eval and treat     Renal/Volume Status (Intra & Extravascular):  -Daily BMP, Mag, Phos   -Goal urine output 0.5-1.0 ml/kg/hr   -Replace electrolytes per unit protocol   -Goal Mag >2.0 and K>4.0    Endocrine  -Q4h glucose checks and SSI  -Monitor for hypo/hyperglycemia     Infectious Disease:  No acute infectious process   -Monitor for s/s of infection including new leukocytosis and fevers     Heme/Onc:  Hx of lung CA (last immunotherapy 5/20/24)  -Daily CBC monitoring   -Transfuse for Hgb <7  -Consider heme/onc consult (pt receives care at [facility])    Derm/MSK:  -ICU skin protocol   -Padded pressure points   -PT/OT eval and treat when appropriate     Ethics/Code Status:  DNR-CCA, pt OK with intubation short term     :  DVT Prophylaxis: SCDs  GI Prophylaxis: none   Bowel Regimen: none   Diet: NPO  CVC: none   Kay: none  Knight: none   Restraints: none   Dispo: admit to ICU    Critical Care Time:  68 minutes spent in preparing to see patient (I.e. review of medical records), evaluation of diagnostics (I.e. labs, imaging, etc.), documentation, discussing plan of care with patient/ family/ caregiver, and/ or coordination of care with multidisciplinary team. Time does not include completion of procedure time.        Raj Gerard PA-C  Pulmonary and Critical Care Medicine   [facility]

## 2024-06-04 NOTE — PROGRESS NOTES
Physical Therapy    Physical Therapy Evaluation    Patient Name: Karen Garcia  MRN: [id number]  Today's Date: 6/4/2024   Time Calculation  Start Time: 1050  Stop Time: 1105  Time Calculation (min): 15 min    Assessment/Plan   PT Assessment  PT Assessment Results: Decreased strength, Decreased endurance, Impaired balance, Decreased mobility, Decreased safety awareness  Rehab Prognosis: Good  Evaluation/Treatment Tolerance: Patient limited by fatigue  End of Session Communication: Bedside nurse  Assessment Comment: 74 year old female presents with decline from baseline functional mobility, right side strength deficits, impaired balance, decreased tolerance to activity, and decreased safety awareness.  End of Session Patient Position: Bed, 3 rail up, Alarm on  IP OR SWING BED PT PLAN  Inpatient or Swing Bed: Inpatient  PT Plan  Treatment/Interventions: Bed mobility, Transfer training, Balance training, Strengthening, Endurance training, Therapeutic exercise, Therapeutic activity  PT Plan: Skilled PT  PT Frequency: 5 times per week  PT Discharge Recommendations: High intensity level of continued care  PT Recommended Transfer Status: Assist x2  PT - OK to Discharge: Yes (with skilled physical therapy services at next level of care.)      Subjective   General Visit Information:  General  Reason for Referral: Impaired mobility with STEMI  Past Medical History Relevant to Rehab: Subarachnoid hemorrhage, s/p repair left MCA and left PCA 1996, lung cancer with lymphatic spread, HTN, COPD,  Patient Position Received: Bed, 4 rail up, Alarm on  General Comment: 74 year old female admit from home with AMS, seizure activity, nausea, vomiting, and diarrhea;  cardiac cath 6/4 with mild non-obstructive CAD.  Home Living:  Home Living  Home Living Comments: Unable to accurately assess  Prior Level of Function:  Prior Function Per Pt/Caregiver Report  Ambulatory Assistance: Independent (?)  Prior Function Comments: Unable to accurately  assess  Precautions:  Precautions  Medical Precautions: Fall precautions  Precautions Comment: aspiration precautions  Vital Signs:       Objective   Pain:  Pain Assessment  Pain Assessment: Unable to self-report (appears uncomfortable and restless)  Cognition:  Cognition  Overall Cognitive Status: Impaired (alert;  aphasia;  appears to attempt to verbalize;  followed simple commands inconsistently;  right side neglect;  right side visual field deficits?)    General Assessments:  General Observation  General Observation: Limited evaluation due to patient requiring nursing interventions (catheter placement and N/G placement).               Activity Tolerance  Endurance: Decreased tolerance for upright activites    Sensation  Sensation Comment: unable to accurately assess       Coordination  Movements are Fluid and Coordinated: No  Lower Body Coordination: left LE appears intact;  right LE flaccid            Functional Assessments:  Bed Mobility  Bed Mobility: No    Transfers  Transfer: No    Ambulation/Gait Training  Ambulation/Gait Training Performed: No    Stairs  Stairs: No  Extremity/Trunk Assessments:  RLE   RLE : Exceptions to WFL  Strength RLE  R Hip Flexion: 0/5  R Knee Extension: 0/5  R Ankle Dorsiflexion: 0/5  LLE   LLE : Exceptions to WFL  Strength LLE  L Hip Flexion: 3/5  L Knee Extension: 3/5  L Ankle Dorsiflexion: 3/5  Outcome Measures:  [facility] Basic Mobility  Turning from your back to your side while in a flat bed without using bedrails: A lot  Moving from lying on your back to sitting on the side of a flat bed without using bedrails: A lot  Moving to and from bed to chair (including a wheelchair): Total  Standing up from a chair using your arms (e.g. wheelchair or bedside chair): Total  To walk in hospital room: Total  Climbing 3-5 steps with railing: Total  Basic Mobility - Total Score: 8    Encounter Problems       Encounter Problems (Active)       Balance       Patient will maintain midline while  sitting on side of bed with min assist.       Start:  06/04/24    Expected End:  06/18/24               Mobility       Bed mobility including supine to sit and sit to supine with min assist.       Start:  06/04/24    Expected End:  06/18/24            Transfer including sit to stand and stand to sit with mod assist x 2.       Start:  06/04/24    Expected End:  06/18/24            Transfers including stand pivot bed to chair and stand pivot chair to bed with mod assist x 2.       Start:  06/04/24    Expected End:  06/18/24                   Education Documentation  Mobility Training, taught by Primo Higgins PT at 6/4/2024 11:29 AM.  Learner: Patient  Readiness: Acceptance  Method: Demonstration, Explanation  Response: Needs Reinforcement    Education Comments  No comments found.

## 2024-06-04 NOTE — CONSULTS
Inpatient consult to Cardiology  Consult performed by: Dane Harding DO  Consult ordered by: Raj Gerard PA-C  Reason for consult: Anterior ST elevation        History Of Present Illness:    Karen Garcia is a 74 y.o. female with history of lung cancer with known lymphatic spread currently on immunotherapy presents with chest discomfort and seizure-like activity.  She had an episode just prior to arrival witnessed by the family while at home suspicious for seizure.  She was unresponsive but grabbing her chest.  EMS was contacted, vitals were stable on initial assessment.  She was noted to have right-sided facial droop and extremity weakness.  She had an EKG performed in the squad that shows atrial fibrillation with a nonspecific interventricular conduction delay and some concerning ST segment abnormalities.  On arrival her laboratory analysis shows kidney function that is only mildly impaired, electrolytes are acceptable.  She is mildly anemic.  She had a CT scan of the head with left-sided MCA findings of uncertain chronicity when reviewed by neuroradiology.  She was not a candidate for tPA.  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  High-sensitivity troponin was drawn and was 8000.  Her repeat EKG more concerning for anterior ST elevation myocardial infarction.  A code STEMI was called and we have been asked to evaluate.    ROS  Unobtainable     Last Recorded Vitals:  Vitals:    06/04/24 0130 06/04/24 0145 06/04/24 0228 06/04/24 0229   BP: 134/82 124/66  135/76   Pulse: 97 91  93   Resp:    20   Temp:       TempSrc:       SpO2: (!) 93% (!) 92% 97% 97%   Weight:           Last Labs:  CBC - 6/4/2024: 12:07 AM  9.9 10.1 176    32.1      CMP - 6/4/2024: 12:07 AM  9.1 5.9 159 --- 0.4   _ 3.4 165 100      PTT - 6/4/2024: 12:07 AM  1.1   11.9 24.5     Hemoglobin A1C   Date/Time Value Ref Range Status   09/20/2023 11:40 AM 5.9 (H) 4.3 - 5.6 % Final     Comment:      "American Diabetes Association guidelines indicate that patients with HgbA1c in the range 5.7-6.4% are at increased risk for development of diabetes, and intervention by lifestyle modification may be beneficial. HgbA1c greater or equal to 6.5% is considered diagnostic of diabetes.   10/27/2020 08:47 AM 6.3 (H) 4.3 - 5.6 % Final     Comment:     American Diabetes Association guidelines indicate that patients with HgbA1c in   the range 5.7-6.4% are at increased risk for development of diabetes, and   intervention by lifestyle modification may be beneficial. HgbA1c greater or   equal to 6.5% is considered diagnostic of diabetes.      Last I/O:  No intake/output data recorded.    Past Cardiology Tests (Last 3 Years):  EKG:  No results found for this or any previous visit from the past 1095 days.    Echo:  No results found for this or any previous visit from the past 1095 days.    Ejection Fractions:  No results found for: \"EF\"  Cath:  No results found for this or any previous visit from the past 1095 days.    Stress Test:  No results found for this or any previous visit from the past 1095 days.    Cardiac Imaging:  No results found for this or any previous visit from the past 1095 days.      Past Medical History:  She has no past medical history on file.    Past Surgical History:  She has no past surgical history on file.      Social History:  She has no history on file for tobacco use, alcohol use, and drug use.    Family History:  No family history on file.     Allergies:  Patient has no known allergies.    Inpatient Medications:  Scheduled medications   Medication Dose Route Frequency     PRN medications   Medication    nitroglycerin     Continuous Medications   Medication Dose Last Rate     Outpatient Medications:  No current outpatient medications    Physical Exam:   Gen: Nonverbal but appears to be in moderate distress   Neck: no JVD, carotid upstroke is brisk and without delay   Heart: Irregular, s1s2+ no mrg   " Lungs: CTA   Ext: warm no edema     Assessment/Plan   Anterior ST elevation  Clinical findings concerning for acute stroke  Myocardial injury  Non-small cell lung cancer      She presents with findings concerning for an acute stroke however her initial presentation is also concerning for myocardial infarction.  She does have anterior ST elevation which may or may not be associated with her interventricular conduction delay.  High-sensitivity troponin is 8000 indicating myocardial injury.  After discussing this case with neuroradiology who feels that she would be a candidate for anticoagulation and antiplatelet therapies if necessary we have decided to pursue diagnostic cardiac catheterization taking an extremely critical approach to what ever disease we may find.  The details of this procedure and this decision as well as the risks benefits and alternatives were discussed thoroughly with the family and they are agreeable.       Code Status:  No Order    I spent 45 minutes in the professional and overall care of this patient.        Dane Harding, DO

## 2024-06-04 NOTE — TELECONSULT
HPI:  74 y.o. female with seizure like activity, recent nausea and diarrhea, shaking in legs, confused. ?R facial droop and R hand weakness.    Last known Well:   NIH Stroke Scale Reported: unclear    Prior Functional Status (Modified [place] Scale):       Imaging Results:  Head CT: no bleed,  Head/Neck CTA/P: No intracranial LVO, has L ICA occlusion of unclear chronicity.  Also with multiple aneurysm clips.     Assessment:   Working Diagnosis:   Seizure vs small stroke     Recommendations:   IV tPA is not recommended. Rationale: non-disabling, improving deficits     Patient is NOT a candidate for endovascular treatment.  Rationale: above     Additional Recommendations:  Admit for stroke workup.  MRI brain w and wo nehemiah to eval for stroke and for seizure workup     Consult completed by:  TELEPHONE communication was used to provide this telehealth service.  Time includes consultation with ED provider and extensive review of data- history, medical records, test results, and neuroimaging studies: 11 - 20 mins was spent in consultation

## 2024-06-04 NOTE — POST-PROCEDURE NOTE
Physician Transition of Care Summary  Invasive Cardiovascular Lab    Procedure Date: 6/4/2024  Attending:    * Dane Harding - Primary  Resident/Fellow/Other Assistant: Surgeons and Role:  * No surgeons found with a matching role *    Indications:   Pre-op Diagnosis     * STEMI (ST elevation myocardial infarction) (Multi) [I21.3]    Post-procedure diagnosis:   Post-op Diagnosis     * STEMI (ST elevation myocardial infarction) (Multi) [I21.3]    Procedure(s):   Left Heart Cath, No LV  77632 - MT L HRT CATH W/NJX L VENTRICULOGRAPHY IMG S&I    PCI  07303 - MT PRQ TRLUML CORONARY ANGIOPLASTY ONE ART/BRANCH        Procedure Findings:   Mild non-obstructive CAD    Description of the Procedure:   The patient was brought to the Cath Lab emergently given the nature of the condition and anterior ST elevations seen on her EKG.  Emergency informed consent was performed by the patient's daughter and signature was obtained.  She was prepped and draped in sterile fashion with particular attention to the right wrist.  Standard radial needle was used for access and upsized to a 6 [place] Islander radial sheath.  We then advanced a 6 [place] Islander Nadir catheter over an 035 J-wire to the level of the coronary cusp.  The wire was withdrawn and the catheter was aspirated and flushed.  We used this catheter to engage the left and right coronary arteries.  We performed angiography and disengaged.  We then removed this catheter over an 035 J-wire and placed a TR band with adequate hemostasis upon removal of the sheath.    LMCA-large-caliber vessel bifurcates in the LAD left circumflex arteries.  There are very mild luminal irregularities.    LAD-also large-caliber vessel gives rise to 1 significant diagonal branch and a recurrent apical branch as well as several smaller diagonals.  The LAD is tortuous and has mild nonobstructive disease.    Circumflex-large-caliber vessel likely codominant giving rise to 2 obtuse marginals a posterolateral branch and a  small posterior descending artery.  There are luminal irregularities throughout this vessel.    RCA-codominant vessel giving rise to right posterior descending artery.  There are luminal irregularities throughout the RCA.    Complications:   None    Stents/Implants:   Implants       No implant documentation for this case.            Anticoagulation/Antiplatelet Plan:   None    Estimated Blood Loss:   * No values recorded between 6/4/2024  2:10 AM and 6/4/2024  2:45 AM *    Anesthesia: Moderate Sedation Anesthesia Staff: No anesthesia staff entered.    Any Specimen(s) Removed:   No specimens collected during this procedure.    Disposition:   ICU      Electronically signed by: Dane Harding DO, 6/4/2024 2:45 AM

## 2024-06-04 NOTE — CARE PLAN
Problem: Pain  Goal: My pain/discomfort is manageable  Outcome: Progressing     Problem: Safety  Goal: Patient will be injury free during hospitalization  Outcome: Progressing  Goal: I will remain free of falls  Outcome: Progressing     Problem: Daily Care  Goal: Daily care needs are met  Outcome: Progressing   The patient's goals for the shift include      The clinical goals for the shift include patient will remain hemodynamically stable    Over the shift, the patient did  make progress toward the following goals.

## 2024-06-04 NOTE — PROGRESS NOTES
Occupational Therapy                 Therapy Communication Note    Patient Name: Karen Garcia  MRN: [id number]  Today's Date: 6/4/2024     Discipline: Occupational Therapy    Missed Visit Reason: Missed Visit Reason: Cancel    Missed Time: Cancel    Comment:  patient is a cancel per nursing request due to agitation.

## 2024-06-04 NOTE — CARE PLAN
Problem: Pain  Goal: My pain/discomfort is manageable  6/4/2024 1035 by Roxanne Lara RN  Outcome: Progressing  6/4/2024 1034 by Roxanne Lara RN  Outcome: Progressing     Problem: Safety  Goal: Patient will be injury free during hospitalization  6/4/2024 1035 by Roxanne Lara RN  Outcome: Progressing  6/4/2024 1034 by Roxanne Lara RN  Outcome: Progressing  Goal: I will remain free of falls  6/4/2024 1035 by Roxanne Lara RN  Outcome: Progressing  6/4/2024 1034 by Roxanne Lara RN  Outcome: Progressing     Problem: Daily Care  Goal: Daily care needs are met  6/4/2024 1035 by Roxanne Lara RN  Outcome: Progressing  6/4/2024 1034 by Roxanne Lara RN  Outcome: Progressing     Problem: Skin  Goal: Decreased wound size/increased tissue granulation at next dressing change  Outcome: Progressing  Goal: Participates in plan/prevention/treatment measures  Outcome: Progressing  Goal: Prevent/manage excess moisture  Outcome: Progressing  Goal: Prevent/minimize sheer/friction injuries  Outcome: Progressing  Goal: Promote/optimize nutrition  Outcome: Progressing  Goal: Promote skin healing  Outcome: Progressing   The patient's goals for the shift include      The clinical goals for the shift include patient will remain hemodynamically stable    Over the shift, the patient did  make progress toward the following goals.

## 2024-06-04 NOTE — CONSULTS
"Nutrition Assessement Note    Nutrition Assessment    Reason for Assessment: Tube feeding recommendations  Admitted w/seizure-like activity (no Hx of seizures). Admitted to ICU post-cardiac cath (no significant findings) for stroke management. Speech eval pending. Consult for tube feed recommendations, NG tube in place.    Reason for Hospital Admission:  Karen Garcia is a 74 y.o. female who is admitted for STEMI    No past medical history on file.   No past surgical history on file.    Nutrition History:  Food and Nutrient History: NPO     Food Allergies/Intolerances:  None  GI Symptoms: None  Oral Problems: Swallowing difficulty    Anthropometrics:  Ht: 167.6 cm (5' 6\"), Wt: 77.7 kg (171 lb 4.8 oz), BMI: 27.66  IBW/kg (Dietitian Calculated): 59.09 kg  Percent of IBW: 132 %     Weight Change:  Daily Weight  06/04/24 : 77.7 kg (171 lb 4.8 oz)     Nutrition Focused Physical Exam Findings:      Nutrition Significant Labs:  Lab Results   Component Value Date    WBC 11.4 (H) 06/04/2024    HGB 9.5 (L) 06/04/2024    HCT 29.6 (L) 06/04/2024     06/04/2024    CHOL 111 (L) 06/04/2024    TRIG 109 06/04/2024    HDL 38.0 (L) 06/04/2024     (H) 06/04/2024     (H) 06/04/2024     06/04/2024    K 3.8 06/04/2024     06/04/2024    CREATININE 0.90 06/04/2024    BUN 36 (H) 06/04/2024    CO2 22 (L) 06/04/2024    INR 1.1 06/04/2024    HGBA1C 5.1 06/04/2024     Nutrition Specific Medications:  aspirin, 81 mg, nasogastric tube, Daily  enoxaparin, 40 mg, subcutaneous, q24h  insulin lispro, 0-5 Units, subcutaneous, q4h  perflutren lipid microspheres, 0.5-10 mL of dilution, intravenous, Once in imaging  perflutren protein A microsphere, 0.5 mL, intravenous, Once in imaging  potassium chloride, 20 mEq, intravenous, Once  sulfur hexafluoride microsphr, 2 mL, intravenous, Once in imaging           Dietary Orders (From admission, onward)       Start     Ordered    06/04/24 1048  NPO Diet; Effective now  Diet " effective now        Comments: Until Swallow Assessment completed by RN or provider    06/04/24 1051                   Estimated Needs:   Estimated Energy Needs  Total Energy Estimated Needs (kCal):  (7423-9020)  Total Estimated Energy Need per Day (kCal/kg):  (22-25)  Method for Estimating Needs: Actual Wt    Estimated Protein Needs  Total Protein Estimated Needs (g):  (62-78)  Total Protein Estimated Needs (g/kg):  (.8-1.0)  Method for Estimating Needs: Actual Wt    Estimated Fluid Needs  Total Fluid Estimated Needs (mL):  (5334-9161)  Method for Estimating Needs: 1 ml/kcal      Nutrition Diagnosis   Nutrition Diagnosis:     Nutrition Diagnosis  Patient has Nutrition Diagnosis: Yes  Diagnosis Status (1): New  Nutrition Diagnosis 1: Inadequate energy intake  Related to (1): NPO     Nutrition Interventions/Recommendations   Nutrition Interventions and Recommendations:    Nutrition Prescription:  Individualized Nutrition Prescription Provided for : 4109-7647 calories, 62-78 gm protein to be provided via enteral nutrition    Nutrition Interventions:   Food and/or Nutrient Delivery Interventions  Interventions: Meals and snacks, Enteral intake  Meals and Snacks: General healthful diet  Goal: Advance per SLP recs  Enteral Intake: Modify composition of enteral nutrition, Modify rate of enteral nutrition  Goal: If tube feed is initiated, recommend: Osmolite 1.5 goal rate @50 mL/Hr to provide 1800 calories, 75 gm protein, 914 mL free water. Suggest start @20 mL/Hr and increase by 10 mL Q4H until goal. Recommend additional 150 mL water flushes Q4H to meet estimated hydration needs.    Education Documentation  No documentation found.         Nutrition Monitoring and Evaluation   Monitoring/Evaluation:   Food/Nutrient Related History Monitoring  Monitoring and Evaluation Plan: Energy intake, Enteral and parenteral nutrition intake  Energy Intake: Estimated energy intake  Criteria: Monitoring for diet order/tube feed  initiation       Time Spent/Follow-up:   Follow Up  Time Spent (min): 30 minutes  Last Date of Nutrition Visit: 06/04/24  Nutrition Follow-Up Needed?: 3-5 days  Follow up Comment: 6/7/24

## 2024-06-04 NOTE — ED TRIAGE NOTES
To ED via [facility] from home, 911 called by family for acute onset of seizure like activity at 2300 tonight. No hx of seizures. EMS on arrival find the pt to be A&Ox1 to pain only. Eyes open and looking, but not responding verbally to them. EKG for EMS pta, was concerning for possible STEMI, Dr. Hawkins wanted to wait until arrival to determine if true STEMI.     Pt on arrival to ER, aphasic, talking but words are nonsensical, she also appears to have right sided facial droop. . Code brain attack paged at 3361.    Pt to CT at 0068.

## 2024-06-04 NOTE — PROGRESS NOTES
Critical Care Medicine       Date:  6/4/2024  Patient:  Karen Garcia  YOB: 1950  MRN:  [id number]   Admit Date:  6/3/2024    Chief Complaint   Patient presents with    Stroke     History of Present Illness:  Karen Garcia is a 74 y.o. year old female patient with Past Medical History of  subarachnoid hemorrhage s/p repair of L MCA and L PCA in 1996, COPD, lung cancer (last cycle of immunotherapy 5/20/24), and HTN presented to the ED via EMS for altered mental status and concern of seizure like activity witnessed by family.  Prior to today, pt was having nausea/vomiting and diarrhea for 2 days and seemed to be dehydrated per family.  On EMS arrival pt was only responsive to pain.  EKG done by EMS was of concern for a STEMI.      On arrival to the ED, pt was aphasic and appeared to have a right sided facial droop.  Initial vitals include temp 37.1, pulse 110, resp 18, /109, SpO2 95% on RA.  CMP with glucose 163, albumin 3.4, ALT//159.  CBC with WBC of 9.9 and H/H 10.1/32.1. CT head with hyperdense left M2/M3 vessel suspicious for an acute intraluminal thrombus, concern for early loss of gray-white differentiation involving the left frontal lobe reflecting acute ischemic infarct.  CT amgio head/neck with left M1 segment is patent. There is an occluded M2 left MCA branch as well as numerous left M3 branches in both superior and inferior divisions, both demonstrating a paucity of opacification when compared to the right side throughout the M4 segments, particularly in the inferior division. Complete occlusion of the left ICA from the bulb to the ophthalmic segment with reconstitution and normalization of caliber at the communicating segment. This is age indeterminate.  Telestroke consulted, and pt is not a candidate for tPA or endovascular treatment.  Pt was given 1x dose IV heparin and aspirin suppository in the ED.  Pt was then taken to cardiac catheterization by cardiologist Dr. Harding.   "    Caller: Kyler Kauffman \"CHELSEA\"    Relationship to patient: Self    Best call back number: [phone]    Additional notes:PATIENT CALLING TO CHECK IF [facility]POINT[facility]. PLEASE CALL HIM TO DISCUSS.           "      Cardiac catheterization done due to anterior ST elevation noted on the EKG.  Mild non-obstructive CAD was noted, with no stent placement required.  Pt was brought to the ICU post-op for continued management.       Interval ICU Events:  6/4: Pt admitted to the ICU post-cardiac catheterization for stroke management.  Telestroke consult, IV tPA is not recommended and pt is not a candidate for endovascular treatment.  Continue q1h neurochecks per protocol.      6/4: Patient remains aphasic with tongue deviating to the right. Moves LUE and LLE.  Awaiting neuro consult.  Okay to tx to stepdown with Q4 neuro checks.      Medical History:  History reviewed. No pertinent past medical history.  History reviewed. No pertinent surgical history.  Medications Prior to Admission   Medication Sig Dispense Refill Last Dose    amLODIPine (Norvasc) 2.5 mg tablet Take 1 tablet (2.5 mg) by mouth once daily.       atorvastatin (Lipitor) 10 mg tablet Take 1 tablet (10 mg) by mouth once daily.       benzonatate (Tessalon) 100 mg capsule Take 1 capsule (100 mg) by mouth every 8 hours if needed for cough.       gabapentin (Neurontin) 300 mg capsule Take 1 capsule (300 mg) by mouth 3 times a day.       ipratropium-albuteroL (Duo-Neb) 0.5-2.5 mg/3 mL nebulizer solution Inhale 3 mL every 6 hours if needed for wheezing or shortness of breath.        Patient has no known allergies.  Social History     Tobacco Use    Smoking status: Never     Passive exposure: Never    Smokeless tobacco: Never   Vaping Use    Vaping status: Never Used   Substance Use Topics    Alcohol use: Never    Drug use: Never     No family history on file.    Hospital Medications:           Current Facility-Administered Medications:     aspirin chewable tablet 81 mg, 81 mg, nasogastric tube, Daily, REGGIE Florez-CNP    dextrose 50 % injection 12.5 g, 12.5 g, intravenous, q15 min PRN, Raj Gerard PA-C    dextrose 50 % injection 25 g, 25 g, intravenous, q15 min  "PRN, Raj Gerard PA-C    enoxaparin (Lovenox) syringe 40 mg, 40 mg, subcutaneous, q24h, GINETTE Florez, 40 mg at 06/04/24 1104    glucagon (Glucagen) injection 1 mg, 1 mg, intramuscular, q15 min PRN, VINNY Gibson-C    glucagon (Glucagen) injection 1 mg, 1 mg, intramuscular, q15 min PRN, VINNY Gibson-C    hydrALAZINE (Apresoline) injection 10 mg, 10 mg, intravenous, q20 min PRN **FOLLOWED BY** [START ON 6/6/2024] hydrALAZINE (Apresoline) tablet 25 mg, 25 mg, oral, q6h PRN, GINETTE Florez    insulin lispro (HumaLOG) injection 0-5 Units, 0-5 Units, subcutaneous, q4h, Raj Gerard PA-C    labetaloL (Normodyne,Trandate) injection 10 mg, 10 mg, intravenous, q10 min PRN, GINETTE Florez    midazolam (Versed) injection 2 mg, 2 mg, intravenous, q6h PRN, GINETTE Florez, 2 mg at 06/04/24 1644    nitroglycerin (Nitrostat) SL tablet 0.4 mg, 0.4 mg, sublingual, q5 min PRN, Raj Gerard PA-C    perflutren lipid microspheres (Definity) injection 0.5-10 mL of dilution, 0.5-10 mL of dilution, intravenous, Once in imaging, GINETTE Heath    perflutren protein A microsphere (Optison) injection 0.5 mL, 0.5 mL, intravenous, Once in imaging, GINETTE Heath    sulfur hexafluoride microsphr (Lumason) injection 24.28 mg, 2 mL, intravenous, Once in imaging, GINETTE Heath    Review of Systems:  14 point review of systems was completed and negative except for those specially mention in my HPI    Physical Exam:    Heart Rate:  []   Temp:  [36.1 °C (97 °F)-37.1 °C (98.8 °F)]   Resp:  [16-26]   BP: (105-149)/()   Height:  [167.6 cm (5' 6\")]   Weight:  [77.7 kg (171 lb 4.8 oz)-83.1 kg (183 lb 3.2 oz)]   SpO2:  [92 %-99 %]     Physical Exam  Constitutional:       General: She is awake. She is not in acute distress.  HENT:      Head: Normocephalic and atraumatic.   Eyes:      Conjunctiva/sclera: Conjunctivae normal.   Cardiovascular:      Rate and " Rhythm: Normal rate and regular rhythm.      Pulses:           Radial pulses are 2+ on the right side and 2+ on the left side.        Posterior tibial pulses are 2+ on the right side and 2+ on the left side.   Pulmonary:      Effort: Pulmonary effort is normal.      Breath sounds: Normal breath sounds and air entry.   Abdominal:      General: Bowel sounds are normal.      Palpations: Abdomen is soft.   Genitourinary:     Comments: Knight catheter in place  Musculoskeletal:      Right lower leg: No edema.      Left lower leg: No edema.   Skin:     General: Skin is warm and dry.      Capillary Refill: Capillary refill takes less than 2 seconds.   Neurological:      GCS: GCS eye subscore is 4. GCS verbal subscore is 2. GCS motor subscore is 4.      Comments: Tongue deviates to right side, no movement to RUE, intermittent twitching to RLE    -LUE moves freely  -LLE withdraws to pain         Objective:    I have reviewed all medications, laboratory results, and imaging pertinent for today's encounter.           Intake/Output Summary (Last 24 hours) at 6/4/2024 1659  Last data filed at 6/4/2024 1600  Gross per 24 hour   Intake 0 ml   Output 905 ml   Net -905 ml       Results for orders placed or performed during the hospital encounter of 06/03/24 (from the past 24 hour(s))   POCT GLUCOSE   Result Value Ref Range    POCT Glucose 171 (H) 74 - 99 mg/dL   CBC and Auto Differential   Result Value Ref Range    WBC 9.9 4.4 - 11.3 x10*3/uL    nRBC 0.2 (H) 0.0 - 0.0 /100 WBCs    RBC 3.11 (L) 4.00 - 5.20 x10*6/uL    Hemoglobin 10.1 (L) 12.0 - 16.0 g/dL    Hematocrit 32.1 (L) 36.0 - 46.0 %     (H) 80 - 100 fL    MCH 32.5 26.0 - 34.0 pg    MCHC 31.5 (L) 32.0 - 36.0 g/dL    RDW 16.1 (H) 11.5 - 14.5 %    Platelets 176 150 - 450 x10*3/uL    Neutrophils % 84.0 40.0 - 80.0 %    Immature Granulocytes %, Automated 1.0 (H) 0.0 - 0.9 %    Lymphocytes % 6.6 13.0 - 44.0 %    Monocytes % 7.6 2.0 - 10.0 %    Eosinophils % 0.5 0.0 - 6.0 %     Basophils % 0.3 0.0 - 2.0 %    Neutrophils Absolute 8.33 (H) 1.60 - 5.50 x10*3/uL    Immature Granulocytes Absolute, Automated 0.10 0.00 - 0.50 x10*3/uL    Lymphocytes Absolute 0.65 (L) 0.80 - 3.00 x10*3/uL    Monocytes Absolute 0.75 0.05 - 0.80 x10*3/uL    Eosinophils Absolute 0.05 0.00 - 0.40 x10*3/uL    Basophils Absolute 0.03 0.00 - 0.10 x10*3/uL   Comprehensive metabolic panel   Result Value Ref Range    Glucose 162 (H) 65 - 99 mg/dL    Sodium 135 133 - 145 mmol/L    Potassium 3.6 3.4 - 5.1 mmol/L    Chloride 99 97 - 107 mmol/L    Bicarbonate 24 24 - 31 mmol/L    Urea Nitrogen 39 (H) 8 - 25 mg/dL    Creatinine 1.10 0.40 - 1.60 mg/dL    eGFR 52 (L) >60 mL/min/1.73m*2    Calcium 9.1 8.5 - 10.4 mg/dL    Albumin 3.4 (L) 3.5 - 5.0 g/dL    Alkaline Phosphatase 100 35 - 125 U/L    Total Protein 5.9 5.9 - 7.9 g/dL     (H) 5 - 40 U/L    Bilirubin, Total 0.4 0.1 - 1.2 mg/dL     (H) 5 - 40 U/L    Anion Gap 12 <=19 mmol/L   Troponin T, High Sensitivity   Result Value Ref Range    Troponin T, High Sensitivity 8,151 (HH) <=14 ng/L   Protime-INR   Result Value Ref Range    Protime 11.9 9.3 - 12.7 seconds    INR 1.1 0.9 - 1.2   APTT   Result Value Ref Range    aPTT 24.5 22.0 - 32.5 seconds   ECG 12 lead   Result Value Ref Range    Ventricular Rate 109 BPM    Atrial Rate 81 BPM    QRS Duration 124 ms    QT Interval 376 ms    QTC Calculation(Bazett) 506 ms    R Axis 86 degrees    T Axis -89 degrees    QRS Count 18 beats    Q Onset 205 ms    T Offset 393 ms    QTC Fredericia 458 ms   ECG 12 lead   Result Value Ref Range    Ventricular Rate 81 BPM    QRS Duration 134 ms    QT Interval 442 ms    QTC Calculation(Bazett) 513 ms    R Axis 89 degrees    T Axis -88 degrees    QRS Count 13 beats    Q Onset 204 ms    T Offset 425 ms    QTC Fredericia 488 ms   POCT GLUCOSE   Result Value Ref Range    POCT Glucose 135 (H) 74 - 99 mg/dL   CBC and Auto Differential   Result Value Ref Range    WBC 11.4 (H) 4.4 - 11.3 x10*3/uL     nRBC 0.0 0.0 - 0.0 /100 WBCs    RBC 2.99 (L) 4.00 - 5.20 x10*6/uL    Hemoglobin 9.5 (L) 12.0 - 16.0 g/dL    Hematocrit 29.6 (L) 36.0 - 46.0 %    MCV 99 80 - 100 fL    MCH 31.8 26.0 - 34.0 pg    MCHC 32.1 32.0 - 36.0 g/dL    RDW 15.7 (H) 11.5 - 14.5 %    Platelets 159 150 - 450 x10*3/uL    Neutrophils % 89.5 40.0 - 80.0 %    Immature Granulocytes %, Automated 1.1 (H) 0.0 - 0.9 %    Lymphocytes % 3.2 13.0 - 44.0 %    Monocytes % 5.9 2.0 - 10.0 %    Eosinophils % 0.1 0.0 - 6.0 %    Basophils % 0.2 0.0 - 2.0 %    Neutrophils Absolute 10.20 (H) 1.60 - 5.50 x10*3/uL    Immature Granulocytes Absolute, Automated 0.12 0.00 - 0.50 x10*3/uL    Lymphocytes Absolute 0.36 (L) 0.80 - 3.00 x10*3/uL    Monocytes Absolute 0.67 0.05 - 0.80 x10*3/uL    Eosinophils Absolute 0.01 0.00 - 0.40 x10*3/uL    Basophils Absolute 0.02 0.00 - 0.10 x10*3/uL   Comprehensive Metabolic Panel   Result Value Ref Range    Glucose 131 (H) 65 - 99 mg/dL    Sodium 141 133 - 145 mmol/L    Potassium 3.8 3.4 - 5.1 mmol/L    Chloride 104 97 - 107 mmol/L    Bicarbonate 22 (L) 24 - 31 mmol/L    Urea Nitrogen 36 (H) 8 - 25 mg/dL    Creatinine 0.90 0.40 - 1.60 mg/dL    eGFR 66 >60 mL/min/1.73m*2    Calcium 8.9 8.5 - 10.4 mg/dL    Albumin 3.3 (L) 3.5 - 5.0 g/dL    Alkaline Phosphatase 94 35 - 125 U/L    Total Protein 5.7 (L) 5.9 - 7.9 g/dL     (H) 5 - 40 U/L    Bilirubin, Total 0.5 0.1 - 1.2 mg/dL     (H) 5 - 40 U/L    Anion Gap 15 <=19 mmol/L   Magnesium   Result Value Ref Range    Magnesium 2.30 1.60 - 3.10 mg/dL   Phosphorus   Result Value Ref Range    Phosphorus 3.7 2.5 - 4.5 mg/dL   Lipid Panel   Result Value Ref Range    Cholesterol 111 (L) 133 - 200 mg/dL    HDL-Cholesterol 38.0 (L) >50.0 mg/dL    Cholesterol/HDL Ratio 2.9 SEE COMMENT    LDL Calculated 51 (L) 65 - 130 mg/dL    Triglycerides 109 40 - 150 mg/dL   Hemoglobin A1C   Result Value Ref Range    Hemoglobin A1C 5.1 See below %    Estimated Average Glucose 100 Not Established mg/dL    Transthoracic Echo (TTE) Complete   Result Value Ref Range    AV pk iman 1.27 m/s    LVOT diam 1.90 cm    AV mn grad 3.5 mmHg    MV E/A ratio 41.95     LV Biplane EF 45 %    RVSP 30.7 mmHg    LVIDd 4.02 cm    AV pk grad 6.4 mmHg    Aortic Valve Area by Continuity of VTI 1.57 cm2    Aortic Valve Area by Continuity of Peak Velocity 1.39 cm2    LV A4C EF 54.1    POCT GLUCOSE   Result Value Ref Range    POCT Glucose 122 (H) 74 - 99 mg/dL   POCT GLUCOSE   Result Value Ref Range    POCT Glucose 100 (H) 74 - 99 mg/dL       Assessment/Plan:    I am currently managing this critically ill patient for the following problems:    Neuro/Psych/Pain Ctrl/Sedation:  #Ischemic stroke   #Hx of subarachnoid hemorrhage s/p repair in 1996  CT head:    1. Hyperdense left M2/M3 vessel suspicious for an acute intraluminal thrombus. Concern for early loss of gray-white differentiation involving the left frontal lobe reflecting acute ischemic infarct. 2. No acute intracranial hemorrhage.  3. 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    4. Gliotic changes in the subinsular white matter and left basal ganglia.  CT angio head/neck:   1. Left M1 segment is patent. There is an occluded M2 left MCA branch as well as numerous left M3 branches in both superior and inferior divisions, both demonstrating a paucity of opacification when compared to the right side throughout the M4 segments, particularly in the inferior division.  2. Complete occlusion of the left ICA from the bulb to the ophthalmic segment with reconstitution and normalization of caliber at the communicating segment. This is age indeterminate.  3. No evidence of intracranial aneurysm. 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  -Neuro checks Q4  -Telestroke consult, not a candidate for tPA or endovascular treatment   -Neurology consulted, pending recs   -EEG ordered, pending   -MRI/MRA brain  ordered  -Asa started through NG tube  -No statin started due to transaminitis  -Hold home gabapentin while NPO  -Versed 2mg IV Q6 for agitation  -CAM ICU score q-shift  -Sleep/wake cycle hygiene  -Delirium precaution     Respiratory/ENT:  #Hx of COPD  #Hx of lung CA  -Currently on RA  -Continuous pulse oximetry, maintain SpO2 >90%  -Duonebs q6h prn for wheezing/shortness of breath   -Hold home tesslon perles   -Aggressive pulmonary/bronchial hygiene     Cardiovascular:  #ST elevation, elevated troponin   #Hx of HTN   -Hold home amlodipine   -Hold home atorvastatin (transaminitis)  -Cardiac catheterization done 6/4, no significant findings   -Cardiology on consult  -ECHO pending   -Continuous cardiac monitoring per unit protocol     GI:  #Dysphagia  #Transaminitis improving  -ALT//159 on admission  -SLP ordered, unable to participate today  -Diet: TF started today  -GI prophylaxis: Not indicated  -Bowel regimen: Miralax PRN    Renal/Volume Status (Intra & Extravascular):  -Knight catheter for accurate I/Os  -Renal function: 36/0.9  -Daily RFP, Mg, Phos  -Replace electrolytes PRN    Endocrine  -HA1C 5.1 (6/4)  -Monitor blood glucose Q4 with SSI coverage  -Goal BS<180  -Hypoglycemic protocol    Infectious Disease:  -Afebrile  -WBC 11.4  -Antibiotics not indicated at this time  -Monitor for SIRS    Heme/Onc:  #Hx Lung CA  (last immunotherapy 5/20/24)   -HH: 9.5/29.6  -Daily CBC  -Consider heme/onc consult (pt receives [facility] at [facility])   -VTE prophylaxis: Lovenox    Derm/MSK:  -PT/OT    Ethics/Code Status:  -DNR CCA    Lines/Knight/Restranits:  CVC: no  [place]: no  Knight: yes  Restraints: no    Dispo: tx to stepdown, sign out given to Dr Acevedo    Plan discussed with: Dr Thomas  Critical Care Time:  60 minutes      REGGIE Florez-CNP  Pulmonary and Critical Care Medicine

## 2024-06-04 NOTE — PROGRESS NOTES
Speech-Language Pathology    Inpatient Speech-Language Pathology Clinical Swallow Evaluation       Patient Name: Karen Garcia  MRN: [id number]  : 1950  Today's Date: 24  Time Calculation  Start Time: 1025  Stop Time: 1055  Time Calculation (min): 30 min   SWEV: 15 min    Reason for Consultation:  Assess oropharyngeal swallow function to r/o aspiration in the setting of acute stroke like sx per stroke protocol     Recommendations:  Risk for Aspiration: Yes   Solid Diet Recommendations : NPO   Liquid Diet Recommendations: NPO, Consider alternate means of nutrition         Medication Administration Recommendations: Non Oral      Skilled dysphagia treatment is warranted  at next level of care.     Assessment:  Consistencies Trialed:     Patient participates in diagnostic trial with 3 ice chips. Further PO trials discontinued d/t limited onset of pharyngeal swallow and severity of cough/choke response demonstrated once pharyngeal swallow is initiated.     Pt is non-verbal/non-vocal throughout evaluation, but does engage with good eye contact and appears to be attempting to communicate/participate.     She is unable to complete oral motor exam despite max multimodal cues, but again she does appear to make efforts. Oral movements are groping and discoordinated, appears apraxic.     She is able to feed self ice cube trials with hand over hand assist, and in fact does not appear to like being fed - as she moves back and away from extended PO offers.     She demonstrates good bolus manipulation, adequate oral closure, and adequate labial wrap around spoon when taking ice chip trials.     She appears to have difficulty with initiation of pharyngeal swallow, but once initiated she demonstrates a severe cough response, turning red with drop in O2 saturation from 95% SpO2 to 90% SpO2. Pt does recover in status and SpO2 within approx. 45 seconds.     Given the severity of pt's bedside presentation and current  complexity of medical status, recommend NPO status. Pt not appropriate yet for participation in MBSS, though this will likely be necessary prior to proceeding to PO intake given her complex acute medical history and profound oral apraxia.     SLP to reassess 6/5/24 as appropriate.     Plan:  SLP Plan: Skilled SLP   SLP Frequency: 3x per week   Duration: Current admission   Next Treatment Priority: REASSESS   Discussed POC: Patient, Nursing   Discussed Risks/Benefits: Yes, Patient, Nursing   Patient/Caregiver Agreeable: Yes     Skilled dysphagia treatment is warranted due to for further education and training on dysphagia management including instruction on oropharyngeal therapeutic exercises and/or compensatory swallowing strategies    Goals:  -Pt to participate in trials of upgraded diet texture for possible diet upgrade in order to ensure least restrictive diet.    Goal initiated:  6/4/2024 Target date: 30 days Baseline: NPO   Today's progress: Recommending NPO with reassess 6/5 as appropr.    Status: Goal initiated  -Pt to participate in oropharyngeal therapeutic exercises to improve oropharyngeal swallow function for return to baseline diet level.    Goal initiated:  6/4/2024 Target date: 30 days Baseline:    Today's progress: Profoundly impaired swallow function, anticipate participation with these more complex motor tasks may be limited.   Status: Goal initiated  -Pt to participate in oral motor therapeutic exercises to improve speech and/or swallow function for return to baseline level.   Goal initiated:  6/4/2024 Target date: 30 days Baseline:    Today's progress: Profoundly impaired coordination and R sided asymmetry   Status: Goal initiated  -Pt to participate in assessment of oropharyngeal swallow function via MBSS for assessment of possible aspiration and to determine least restrictive diet for meeting pt's nutritional and hydration needs.    Goal initiated:  6/4/2024 Target date: 30 days Baseline:     Today's progress: Not appropriate at this time d/t severity of impairment and current medical status.    Status: Goal initiated    Subjective   Pt is awake in bed. Alert. Reaching for SLP. Moving around in bed. Intermittently responds with head nods. Does appear to understand conversation, but profoundly aphasic/apraxic.       General Visit Information:  Past medical history:   Per H&P:    Karen Garcia is a 74 y.o. year old female patient with Past Medical History of  subarachnoid hemorrhage s/p repair of L MCA and L PCA in 1996, COPD, lung cancer (last cycle of immunotherapy 5/20/24), and HTN presented to the ED via EMS for altered mental status and concern of seizure like activity witnessed by family.  Prior to today, pt was having nausea/vomiting and diarrhea for 2 days and seemed to be dehydrated per family.  On EMS arrival pt was only responsive to pain.  EKG done by EMS was of concern for a STEMI.      On arrival to the ED, pt was aphasic and appeared to have a right sided facial droop.  Initial vitals include temp 37.1, pulse 110, resp 18, /109, SpO2 95% on RA.  CMP with glucose 163, albumin 3.4, ALT//159.  CBC with WBC of 9.9 and H/H 10.1/32.1. CT head with hyperdense left M2/M3 vessel suspicious for an acute intraluminal thrombus, concern for early loss of gray-white differentiation involving the left frontal lobe reflecting acute ischemic infarct.  CT amgio head/neck with left M1 segment is patent. There is an occluded M2 left MCA branch as well as numerous left M3 branches in both superior and inferior divisions, both demonstrating a paucity of opacification when compared to the right side throughout the M4 segments, particularly in the inferior division. Complete occlusion of the left ICA from the bulb to the ophthalmic segment with reconstitution and normalization of caliber at the communicating segment. This is age indeterminate.  Telestroke consulted, and pt is not a candidate for tPA  or endovascular treatment.  Pt was given 1x dose IV heparin and aspirin suppository in the ED.  Pt was then taken to cardiac catheterization by cardiologist Dr. Harding.       Cardiac catheterization done due to anterior ST elevation noted on the EKG.  Mild non-obstructive CAD was noted, with no stent placement required.  Pt was brought to the ICU post-op for continued management.       Pain:  Pain Assessment  Pain Assessment: Obrien-Baker FACES  Obrien-Baker FACES Pain Rating: Hurts little more       Education:   Patient education completed regarding results and recommendations of SLP evaluation. Pt nods head to demonstrate understanding, but further review is indicated.     Care Team involvement:   Communicated with bedside nurse prior to evaluation with clearance for patient participation obtained and Results of the bedside swallowing evaluation were discussed with nurse and verbalized understanding was noted.

## 2024-06-04 NOTE — CONSULTS
Inpatient consult to Neurology  Consult performed by: Darryl Carlisle MD  Consult ordered by: Danielle Alexandra, APRN-CNP      Chief complaint: Status post seizure and right-sided weakness with speech disturbances    History Of Present Illness  Karen Garcia is a 74 y.o. female presenting with multiple medical problems including lung cancer with no lymphatic spread on immunotherapy who was admitted with seizure-like activity.  Upon arrival to the house, EMS noted right facial droop with speech disturbances and right-sided weakness.  Patient also noted to have atrial fibrillation.  Upon arrival to the ED, patient was evaluated by telestroke and found not to be a candidate for tPA.  CT scan of the head consistent with left MCA CVA of unknown duration.  At this time, family is unavailable and unknown if some of her deficits are baseline.  Neurological consultation was requested for further evaluation and management.     Past Medical History  She has a past medical history of COPD (chronic obstructive pulmonary disease) (Multi).    Surgical History  She has no past surgical history on file.     Social History  She reports that she has never smoked. She has never been exposed to tobacco smoke. She has never used smokeless tobacco. She reports that she does not drink alcohol and does not use drugs.    Family history: No significant family history is noted    Allergies  Patient has no known allergies.    Medications  Medications Prior to Admission   Medication Sig Dispense Refill Last Dose    amLODIPine (Norvasc) 2.5 mg tablet Take 1 tablet (2.5 mg) by mouth once daily.       atorvastatin (Lipitor) 10 mg tablet Take 1 tablet (10 mg) by mouth once daily.       benzonatate (Tessalon) 100 mg capsule Take 1 capsule (100 mg) by mouth every 8 hours if needed for cough.       gabapentin (Neurontin) 300 mg capsule Take 1 capsule (300 mg) by mouth 3 times a day.       ipratropium-albuteroL (Duo-Neb) 0.5-2.5 mg/3 mL nebulizer  "solution Inhale 3 mL every 6 hours if needed for wheezing or shortness of breath.        Review of Systems  14 point review of systems could not be reviewed in detail given the patient's mental status and aphasia    Neurological Exam limited neurological examination was obtained given the patient's condition.  Follows simple commands  Physical Exam  Mental Status :  Alert , O x 1  Opens eyes and tracks    Speech : Significant expressive and receptive aphasia noted    CN 2-12 :  Pupils round , sluggishly reacting to light  Fundus could not be assessed  Right facial asymmetry noted  Hearing acuity intact bilaterally  Tongue midline    Motor:  Strength right-sided weakness at 0 out of 5 and moves left-sided minimally    Sensory:   Intact to light touch    Reflexes : 1+  symmetric with equivocal toes    Coordination : Could not be assessed    Gait : Unable to assess.      Last Recorded Vitals   Blood pressure 146/71, pulse 106, temperature 37.1 °C (98.7 °F), temperature source Oral, resp. rate (!) 27, height 1.676 m (5' 6\"), weight 77.7 kg (171 lb 4.8 oz), SpO2 92%.    Relevant Results      Results for orders placed or performed during the hospital encounter of 06/03/24 (from the past 24 hour(s))   POCT GLUCOSE   Result Value Ref Range    POCT Glucose 171 (H) 74 - 99 mg/dL   CBC and Auto Differential   Result Value Ref Range    WBC 9.9 4.4 - 11.3 x10*3/uL    nRBC 0.2 (H) 0.0 - 0.0 /100 WBCs    RBC 3.11 (L) 4.00 - 5.20 x10*6/uL    Hemoglobin 10.1 (L) 12.0 - 16.0 g/dL    Hematocrit 32.1 (L) 36.0 - 46.0 %     (H) 80 - 100 fL    MCH 32.5 26.0 - 34.0 pg    MCHC 31.5 (L) 32.0 - 36.0 g/dL    RDW 16.1 (H) 11.5 - 14.5 %    Platelets 176 150 - 450 x10*3/uL    Neutrophils % 84.0 40.0 - 80.0 %    Immature Granulocytes %, Automated 1.0 (H) 0.0 - 0.9 %    Lymphocytes % 6.6 13.0 - 44.0 %    Monocytes % 7.6 2.0 - 10.0 %    Eosinophils % 0.5 0.0 - 6.0 %    Basophils % 0.3 0.0 - 2.0 %    Neutrophils Absolute 8.33 (H) 1.60 - 5.50 " x10*3/uL    Immature Granulocytes Absolute, Automated 0.10 0.00 - 0.50 x10*3/uL    Lymphocytes Absolute 0.65 (L) 0.80 - 3.00 x10*3/uL    Monocytes Absolute 0.75 0.05 - 0.80 x10*3/uL    Eosinophils Absolute 0.05 0.00 - 0.40 x10*3/uL    Basophils Absolute 0.03 0.00 - 0.10 x10*3/uL   Comprehensive metabolic panel   Result Value Ref Range    Glucose 162 (H) 65 - 99 mg/dL    Sodium 135 133 - 145 mmol/L    Potassium 3.6 3.4 - 5.1 mmol/L    Chloride 99 97 - 107 mmol/L    Bicarbonate 24 24 - 31 mmol/L    Urea Nitrogen 39 (H) 8 - 25 mg/dL    Creatinine 1.10 0.40 - 1.60 mg/dL    eGFR 52 (L) >60 mL/min/1.73m*2    Calcium 9.1 8.5 - 10.4 mg/dL    Albumin 3.4 (L) 3.5 - 5.0 g/dL    Alkaline Phosphatase 100 35 - 125 U/L    Total Protein 5.9 5.9 - 7.9 g/dL     (H) 5 - 40 U/L    Bilirubin, Total 0.4 0.1 - 1.2 mg/dL     (H) 5 - 40 U/L    Anion Gap 12 <=19 mmol/L   Troponin T, High Sensitivity   Result Value Ref Range    Troponin T, High Sensitivity 8,151 (HH) <=14 ng/L   Protime-INR   Result Value Ref Range    Protime 11.9 9.3 - 12.7 seconds    INR 1.1 0.9 - 1.2   APTT   Result Value Ref Range    aPTT 24.5 22.0 - 32.5 seconds   ECG 12 lead   Result Value Ref Range    Ventricular Rate 109 BPM    Atrial Rate 81 BPM    QRS Duration 124 ms    QT Interval 376 ms    QTC Calculation(Bazett) 506 ms    R Axis 86 degrees    T Axis -89 degrees    QRS Count 18 beats    Q Onset 205 ms    T Offset 393 ms    QTC Fredericia 458 ms   ECG 12 lead   Result Value Ref Range    Ventricular Rate 81 BPM    QRS Duration 134 ms    QT Interval 442 ms    QTC Calculation(Bazett) 513 ms    R Axis 89 degrees    T Axis -88 degrees    QRS Count 13 beats    Q Onset 204 ms    T Offset 425 ms    QTC Fredericia 488 ms   POCT GLUCOSE   Result Value Ref Range    POCT Glucose 135 (H) 74 - 99 mg/dL   CBC and Auto Differential   Result Value Ref Range    WBC 11.4 (H) 4.4 - 11.3 x10*3/uL    nRBC 0.0 0.0 - 0.0 /100 WBCs    RBC 2.99 (L) 4.00 - 5.20 x10*6/uL     Hemoglobin 9.5 (L) 12.0 - 16.0 g/dL    Hematocrit 29.6 (L) 36.0 - 46.0 %    MCV 99 80 - 100 fL    MCH 31.8 26.0 - 34.0 pg    MCHC 32.1 32.0 - 36.0 g/dL    RDW 15.7 (H) 11.5 - 14.5 %    Platelets 159 150 - 450 x10*3/uL    Neutrophils % 89.5 40.0 - 80.0 %    Immature Granulocytes %, Automated 1.1 (H) 0.0 - 0.9 %    Lymphocytes % 3.2 13.0 - 44.0 %    Monocytes % 5.9 2.0 - 10.0 %    Eosinophils % 0.1 0.0 - 6.0 %    Basophils % 0.2 0.0 - 2.0 %    Neutrophils Absolute 10.20 (H) 1.60 - 5.50 x10*3/uL    Immature Granulocytes Absolute, Automated 0.12 0.00 - 0.50 x10*3/uL    Lymphocytes Absolute 0.36 (L) 0.80 - 3.00 x10*3/uL    Monocytes Absolute 0.67 0.05 - 0.80 x10*3/uL    Eosinophils Absolute 0.01 0.00 - 0.40 x10*3/uL    Basophils Absolute 0.02 0.00 - 0.10 x10*3/uL   Comprehensive Metabolic Panel   Result Value Ref Range    Glucose 131 (H) 65 - 99 mg/dL    Sodium 141 133 - 145 mmol/L    Potassium 3.8 3.4 - 5.1 mmol/L    Chloride 104 97 - 107 mmol/L    Bicarbonate 22 (L) 24 - 31 mmol/L    Urea Nitrogen 36 (H) 8 - 25 mg/dL    Creatinine 0.90 0.40 - 1.60 mg/dL    eGFR 66 >60 mL/min/1.73m*2    Calcium 8.9 8.5 - 10.4 mg/dL    Albumin 3.3 (L) 3.5 - 5.0 g/dL    Alkaline Phosphatase 94 35 - 125 U/L    Total Protein 5.7 (L) 5.9 - 7.9 g/dL     (H) 5 - 40 U/L    Bilirubin, Total 0.5 0.1 - 1.2 mg/dL     (H) 5 - 40 U/L    Anion Gap 15 <=19 mmol/L   Magnesium   Result Value Ref Range    Magnesium 2.30 1.60 - 3.10 mg/dL   Phosphorus   Result Value Ref Range    Phosphorus 3.7 2.5 - 4.5 mg/dL   Lipid Panel   Result Value Ref Range    Cholesterol 111 (L) 133 - 200 mg/dL    HDL-Cholesterol 38.0 (L) >50.0 mg/dL    Cholesterol/HDL Ratio 2.9 SEE COMMENT    LDL Calculated 51 (L) 65 - 130 mg/dL    Triglycerides 109 40 - 150 mg/dL   Hemoglobin A1C   Result Value Ref Range    Hemoglobin A1C 5.1 See below %    Estimated Average Glucose 100 Not Established mg/dL   Transthoracic Echo (TTE) Complete   Result Value Ref Range    AV pk iman  1.27 m/s    LVOT diam 1.90 cm    AV mn grad 3.5 mmHg    MV E/A ratio 41.95     LV Biplane EF 45 %    RVSP 30.7 mmHg    LVIDd 4.02 cm    AV pk grad 6.4 mmHg    Aortic Valve Area by Continuity of VTI 1.57 cm2    Aortic Valve Area by Continuity of Peak Velocity 1.39 cm2    LV A4C EF 54.1    POCT GLUCOSE   Result Value Ref Range    POCT Glucose 122 (H) 74 - 99 mg/dL   POCT GLUCOSE   Result Value Ref Range    POCT Glucose 100 (H) 74 - 99 mg/dL        Assessment/Plan   Principal Problem:    STEMI (ST elevation myocardial infarction) (Multi)  Patient is 74-year-old female with multiple medical problems admitted with seizure-like episode with right-sided weakness and aphasia rule out CVA and metastatic disease given her history of lung cancer, prognosis guarded.    Recommendations;  Reviewed  CT scan brain and  CT angiogram head /neck results.  MRI brain with and without contrast pending  Routine EEG  Cardiology on board given her history of atrial fibrillation  Will defer management of anticoagulation  Pulmonary consultation for further evaluation of lung mass  And oncology consultation  Start Keppra 500 mg twice daily  Sepsis workup and treatment  2 d echo pending  PT/OT eval and treatment  Speech eval and treatment  Neurochecks  Stroke labs including TSH, Hb A1c, Lipid Panel  Permissive Blood pressure as per stroke protocol  Blood pressure / Blood sugar monitoring and control  Telemetry monitoring   Continue current management  Discussed the above plan with the patient / nurse / family / attending physician  Parts of this chart have been completed using voice recognition software. Please excuse any errors of transcription.

## 2024-06-04 NOTE — PROGRESS NOTES
Music Therapy Note    Karen Garcia was referred by     Therapy Session  Referral Type: New referral this admission  Visit Type: New visit  Session Start Time: 1120  Session End Time: 1121  Intervention Delivery: In-person  Conflict of Service: Working with other staff  Family Present for Session: None               Treatment/Interventions       Post-assessment  Unable to Assess Reason: Did not provide expressive therapy intervention  Continue Visiting: Yes  Total Session Time (min): 1 minutes         Education Documentation  No documentation found.

## 2024-06-04 NOTE — NURSING NOTE
3ml of air removed from TR band at 0415. Bleeding noted, 3ml of air reinserted. VINNY Anthony notified.

## 2024-06-04 NOTE — CARE PLAN
The patient's goals for the shift include      The clinical goals for the shift include patient will remain hemodynamically stable    Over the shift, the patient did  make progress toward the following goals.

## 2024-06-05 LAB
ALBUMIN SERPL-MCNC: 3.2 G/DL (ref 3.5–5)
ALP BLD-CCNC: 114 U/L (ref 35–125)
ALT SERPL-CCNC: 169 U/L (ref 5–40)
ANION GAP SERPL CALC-SCNC: 13 MMOL/L
AST SERPL-CCNC: 150 U/L (ref 5–40)
BASOPHILS # BLD AUTO: 0.04 X10*3/UL (ref 0–0.1)
BASOPHILS NFR BLD AUTO: 0.3 %
BILIRUB SERPL-MCNC: 0.6 MG/DL (ref 0.1–1.2)
BUN SERPL-MCNC: 40 MG/DL (ref 8–25)
CALCIUM SERPL-MCNC: 9.5 MG/DL (ref 8.5–10.4)
CHLORIDE SERPL-SCNC: 109 MMOL/L (ref 97–107)
CO2 SERPL-SCNC: 20 MMOL/L (ref 24–31)
CREAT SERPL-MCNC: 0.8 MG/DL (ref 0.4–1.6)
EGFRCR SERPLBLD CKD-EPI 2021: 76 ML/MIN/1.73M*2
EOSINOPHIL # BLD AUTO: 0.01 X10*3/UL (ref 0–0.4)
EOSINOPHIL NFR BLD AUTO: 0.1 %
ERYTHROCYTE [DISTWIDTH] IN BLOOD BY AUTOMATED COUNT: 15.8 % (ref 11.5–14.5)
GLUCOSE BLD MANUAL STRIP-MCNC: 109 MG/DL (ref 74–99)
GLUCOSE BLD MANUAL STRIP-MCNC: 112 MG/DL (ref 74–99)
GLUCOSE BLD MANUAL STRIP-MCNC: 113 MG/DL (ref 74–99)
GLUCOSE BLD MANUAL STRIP-MCNC: 117 MG/DL (ref 74–99)
GLUCOSE BLD MANUAL STRIP-MCNC: 124 MG/DL (ref 74–99)
GLUCOSE BLD MANUAL STRIP-MCNC: 124 MG/DL (ref 74–99)
GLUCOSE SERPL-MCNC: 125 MG/DL (ref 65–99)
HCT VFR BLD AUTO: 33.3 % (ref 36–46)
HGB BLD-MCNC: 10.1 G/DL (ref 12–16)
IMM GRANULOCYTES # BLD AUTO: 0.12 X10*3/UL (ref 0–0.5)
IMM GRANULOCYTES NFR BLD AUTO: 0.9 % (ref 0–0.9)
LYMPHOCYTES # BLD AUTO: 0.47 X10*3/UL (ref 0.8–3)
LYMPHOCYTES NFR BLD AUTO: 3.7 %
MAGNESIUM SERPL-MCNC: 2.6 MG/DL (ref 1.6–3.1)
MCH RBC QN AUTO: 32.3 PG (ref 26–34)
MCHC RBC AUTO-ENTMCNC: 30.3 G/DL (ref 32–36)
MCV RBC AUTO: 106 FL (ref 80–100)
MONOCYTES # BLD AUTO: 0.93 X10*3/UL (ref 0.05–0.8)
MONOCYTES NFR BLD AUTO: 7.4 %
NEUTROPHILS # BLD AUTO: 11.08 X10*3/UL (ref 1.6–5.5)
NEUTROPHILS NFR BLD AUTO: 87.6 %
NRBC BLD-RTO: 0.3 /100 WBCS (ref 0–0)
PHOSPHATE SERPL-MCNC: 4.7 MG/DL (ref 2.5–4.5)
PLATELET # BLD AUTO: 168 X10*3/UL (ref 150–450)
POTASSIUM SERPL-SCNC: 4.7 MMOL/L (ref 3.4–5.1)
PROT SERPL-MCNC: 5.9 G/DL (ref 5.9–7.9)
RBC # BLD AUTO: 3.13 X10*6/UL (ref 4–5.2)
SODIUM SERPL-SCNC: 142 MMOL/L (ref 133–145)
WBC # BLD AUTO: 12.7 X10*3/UL (ref 4.4–11.3)

## 2024-06-05 PROCEDURE — 83735 ASSAY OF MAGNESIUM: CPT

## 2024-06-05 PROCEDURE — 85025 COMPLETE CBC W/AUTO DIFF WBC: CPT

## 2024-06-05 PROCEDURE — 2060000001 HC INTERMEDIATE ICU ROOM DAILY

## 2024-06-05 PROCEDURE — 99232 SBSQ HOSP IP/OBS MODERATE 35: CPT | Performed by: INTERNAL MEDICINE

## 2024-06-05 PROCEDURE — 97530 THERAPEUTIC ACTIVITIES: CPT | Mod: GP

## 2024-06-05 PROCEDURE — 99223 1ST HOSP IP/OBS HIGH 75: CPT | Performed by: INTERNAL MEDICINE

## 2024-06-05 PROCEDURE — 84100 ASSAY OF PHOSPHORUS: CPT

## 2024-06-05 PROCEDURE — 2500000001 HC RX 250 WO HCPCS SELF ADMINISTERED DRUGS (ALT 637 FOR MEDICARE OP): Performed by: INTERNAL MEDICINE

## 2024-06-05 PROCEDURE — 82947 ASSAY GLUCOSE BLOOD QUANT: CPT | Mod: 91

## 2024-06-05 PROCEDURE — 84075 ASSAY ALKALINE PHOSPHATASE: CPT

## 2024-06-05 PROCEDURE — 97167 OT EVAL HIGH COMPLEX 60 MIN: CPT | Mod: GO

## 2024-06-05 PROCEDURE — 2500000004 HC RX 250 GENERAL PHARMACY W/ HCPCS (ALT 636 FOR OP/ED)

## 2024-06-05 PROCEDURE — 2500000004 HC RX 250 GENERAL PHARMACY W/ HCPCS (ALT 636 FOR OP/ED): Performed by: INTERNAL MEDICINE

## 2024-06-05 PROCEDURE — 97530 THERAPEUTIC ACTIVITIES: CPT | Mod: GO

## 2024-06-05 PROCEDURE — 36415 COLL VENOUS BLD VENIPUNCTURE: CPT

## 2024-06-05 RX ORDER — ASPIRIN 300 MG/1
150 SUPPOSITORY RECTAL DAILY
Status: DISCONTINUED | OUTPATIENT
Start: 2024-06-05 | End: 2024-06-06

## 2024-06-05 RX ORDER — DIAZEPAM 5 MG/ML
2 INJECTION, SOLUTION INTRAMUSCULAR; INTRAVENOUS ONCE
Status: COMPLETED | OUTPATIENT
Start: 2024-06-05 | End: 2024-06-05

## 2024-06-05 RX ADMIN — DIAZEPAM 2 MG: 10 INJECTION, SOLUTION INTRAMUSCULAR; INTRAVENOUS at 10:18

## 2024-06-05 RX ADMIN — ASPIRIN 150 MG: 300 SUPPOSITORY RECTAL at 12:27

## 2024-06-05 RX ADMIN — LEVETIRACETAM 500 MG: 5 INJECTION INTRAVENOUS at 23:06

## 2024-06-05 RX ADMIN — DIAZEPAM 2 MG: 5 INJECTION INTRAMUSCULAR; INTRAVENOUS at 03:54

## 2024-06-05 RX ADMIN — LEVETIRACETAM 500 MG: 5 INJECTION INTRAVENOUS at 10:19

## 2024-06-05 RX ADMIN — ENOXAPARIN SODIUM 40 MG: 40 INJECTION SUBCUTANEOUS at 10:17

## 2024-06-05 ASSESSMENT — PAIN - FUNCTIONAL ASSESSMENT
PAIN_FUNCTIONAL_ASSESSMENT: FLACC (FACE, LEGS, ACTIVITY, CRY, CONSOLABILITY)
PAIN_FUNCTIONAL_ASSESSMENT: 0-10

## 2024-06-05 ASSESSMENT — COGNITIVE AND FUNCTIONAL STATUS - GENERAL
PERSONAL GROOMING: TOTAL
DRESSING REGULAR LOWER BODY CLOTHING: TOTAL
MOVING TO AND FROM BED TO CHAIR: A LOT
STANDING UP FROM CHAIR USING ARMS: TOTAL
WALKING IN HOSPITAL ROOM: TOTAL
TURNING FROM BACK TO SIDE WHILE IN FLAT BAD: A LITTLE
HELP NEEDED FOR BATHING: TOTAL
EATING MEALS: TOTAL
TOILETING: TOTAL
CLIMB 3 TO 5 STEPS WITH RAILING: TOTAL
MOVING FROM LYING ON BACK TO SITTING ON SIDE OF FLAT BED WITH BEDRAILS: A LOT
DRESSING REGULAR UPPER BODY CLOTHING: TOTAL
MOBILITY SCORE: 10
DAILY ACTIVITIY SCORE: 6

## 2024-06-05 ASSESSMENT — ACTIVITIES OF DAILY LIVING (ADL)
BATHING_ASSISTANCE: TOTAL
ADL_ASSISTANCE: INDEPENDENT

## 2024-06-05 ASSESSMENT — PAIN SCALES - GENERAL
PAINLEVEL_OUTOF10: 0 - NO PAIN
PAINLEVEL_OUTOF10: 0 - NO PAIN

## 2024-06-05 NOTE — PROGRESS NOTES
"Karen Garcia is a 74 y.o. female on day 1 of admission presenting with STEMI (ST elevation myocardial infarction) (Multi).    Subjective   Remains aphasic, vitals are stable.  Telemetry shows atrial fibrillation with frequent PVCs.       Objective     Physical Exam   Gen: NAD   Neck: no JVD, carotid upstroke is brisk and without delay   Heart: Irregular, s1s2+ no mrg   Lungs: CTA   Ext: warm no edema    Last Recorded Vitals  Blood pressure 129/59, pulse 80, temperature 36 °C (96.8 °F), temperature source Temporal, resp. rate 22, height 1.676 m (5' 6\"), weight 78.2 kg (172 lb 6.4 oz), SpO2 96%.  Intake/Output last 3 Shifts:  I/O last 3 completed shifts:  In: 100 (1.3 mL/kg) [IV Piggyback:100]  Out: 1180 (15.1 mL/kg) [Urine:1175 (0.4 mL/kg/hr); Blood:5]  Weight: 78.2 kg     Relevant Results  Results for orders placed or performed during the hospital encounter of 06/03/24 (from the past 24 hour(s))   Transthoracic Echo (TTE) Complete   Result Value Ref Range    AV pk iman 1.27 m/s    LVOT diam 1.90 cm    AV mn grad 3.5 mmHg    MV E/A ratio 41.95     LV Biplane EF 45 %    RVSP 30.7 mmHg    LVIDd 4.02 cm    AV pk grad 6.4 mmHg    Aortic Valve Area by Continuity of VTI 1.57 cm2    Aortic Valve Area by Continuity of Peak Velocity 1.39 cm2    LV A4C EF 54.1    POCT GLUCOSE   Result Value Ref Range    POCT Glucose 122 (H) 74 - 99 mg/dL   POCT GLUCOSE   Result Value Ref Range    POCT Glucose 100 (H) 74 - 99 mg/dL   POCT GLUCOSE   Result Value Ref Range    POCT Glucose 108 (H) 74 - 99 mg/dL   POCT GLUCOSE   Result Value Ref Range    POCT Glucose 124 (H) 74 - 99 mg/dL   POCT GLUCOSE   Result Value Ref Range    POCT Glucose 113 (H) 74 - 99 mg/dL   CBC and Auto Differential   Result Value Ref Range    WBC 12.7 (H) 4.4 - 11.3 x10*3/uL    nRBC 0.3 (H) 0.0 - 0.0 /100 WBCs    RBC 3.13 (L) 4.00 - 5.20 x10*6/uL    Hemoglobin 10.1 (L) 12.0 - 16.0 g/dL    Hematocrit 33.3 (L) 36.0 - 46.0 %     (H) 80 - 100 fL    MCH 32.3 26.0 - 34.0 " pg    MCHC 30.3 (L) 32.0 - 36.0 g/dL    RDW 15.8 (H) 11.5 - 14.5 %    Platelets 168 150 - 450 x10*3/uL    Neutrophils % 87.6 40.0 - 80.0 %    Immature Granulocytes %, Automated 0.9 0.0 - 0.9 %    Lymphocytes % 3.7 13.0 - 44.0 %    Monocytes % 7.4 2.0 - 10.0 %    Eosinophils % 0.1 0.0 - 6.0 %    Basophils % 0.3 0.0 - 2.0 %    Neutrophils Absolute 11.08 (H) 1.60 - 5.50 x10*3/uL    Immature Granulocytes Absolute, Automated 0.12 0.00 - 0.50 x10*3/uL    Lymphocytes Absolute 0.47 (L) 0.80 - 3.00 x10*3/uL    Monocytes Absolute 0.93 (H) 0.05 - 0.80 x10*3/uL    Eosinophils Absolute 0.01 0.00 - 0.40 x10*3/uL    Basophils Absolute 0.04 0.00 - 0.10 x10*3/uL   Comprehensive Metabolic Panel   Result Value Ref Range    Glucose 125 (H) 65 - 99 mg/dL    Sodium 142 133 - 145 mmol/L    Potassium 4.7 3.4 - 5.1 mmol/L    Chloride 109 (H) 97 - 107 mmol/L    Bicarbonate 20 (L) 24 - 31 mmol/L    Urea Nitrogen 40 (H) 8 - 25 mg/dL    Creatinine 0.80 0.40 - 1.60 mg/dL    eGFR 76 >60 mL/min/1.73m*2    Calcium 9.5 8.5 - 10.4 mg/dL    Albumin 3.2 (L) 3.5 - 5.0 g/dL    Alkaline Phosphatase 114 35 - 125 U/L    Total Protein 5.9 5.9 - 7.9 g/dL     (H) 5 - 40 U/L    Bilirubin, Total 0.6 0.1 - 1.2 mg/dL     (H) 5 - 40 U/L    Anion Gap 13 <=19 mmol/L   Magnesium   Result Value Ref Range    Magnesium 2.60 1.60 - 3.10 mg/dL   Phosphorus   Result Value Ref Range    Phosphorus 4.7 (H) 2.5 - 4.5 mg/dL   POCT GLUCOSE   Result Value Ref Range    POCT Glucose 112 (H) 74 - 99 mg/dL                 Assessment/Plan   Principal Problem:    STEMI (ST elevation myocardial infarction) (Multi)    Anterior ST elevation  Clinical findings concerning for acute stroke  Myocardial injury  Non-small cell lung cancer        She presents with findings concerning for an acute stroke however her initial presentation is also concerning for myocardial infarction.  She does have anterior ST elevation which may or may not be associated with her interventricular  conduction delay.  High-sensitivity troponin is 8000 indicating myocardial injury.  After discussing this case with neuroradiology who feels that she would be a candidate for anticoagulation and antiplatelet therapies if necessary we have decided to pursue diagnostic cardiac catheterization taking an extremely critical approach to what ever disease we may find.  The details of this procedure and this decision as well as the risks benefits and alternatives were discussed thoroughly with the family and they are agreeable.    6/5: Cardiac catheterization completed by Dr. Harding revealed mild nonobstructive coronary artery disease.  Patient was transitioned to the stepdown unit yesterday.  Echocardiogram completed yesterday revealed a preserved ejection fraction of 55 to 60% with no regional wall motion abnormalities.        I spent 25 minutes in the professional and overall care of this patient.      Hanna Thompson, APRN-CNP    This is a shared visit. I have reviewed the Advanced Practice Provider's encounter note, approve the Advanced Practice Provider's documentation, and provide the following additional information from my personal encounter.      Gen: NAD   Neck: no JVD, carotid upstroke is brisk and without delay   Heart: Irregular s1s2+ no mrg   Lungs: CTA   Ext: warm no edema    As mentioned remains aphasic.  Atrial fibrillation with frequent PVCs in the pattern of bigeminy.  Would favor Eliquis in addition to clopidogrel given her recent stroke and history of atrial fibrillation.  Would recommend initiating once that is okay from a neurologic standpoint and if able to take p.o.  Will also initiate a high potency statin as part of her secondary prevention regimen.  Will follow.    Dane Harding, DO

## 2024-06-05 NOTE — NURSING NOTE
Patient arrived from ICU. Patient is A&Ox1. Patient is alert. Call light in reach. Patient has been oriented to the room. Patient is sinus rhthym with Pvc's @66bpm.

## 2024-06-05 NOTE — CARE PLAN
Problem: Balance  Goal: Patient will maintain midline while sitting on side of bed with min assist.  Outcome: Progressing     Problem: Mobility  Goal: Bed mobility including supine to sit and sit to supine with min assist.  Outcome: Progressing  Goal: Transfer including sit to stand and stand to sit with mod assist x 2.  Outcome: Progressing

## 2024-06-05 NOTE — PROGRESS NOTES
Occupational Therapy    Evaluation/Treatment    Patient Name: Karen Garcia  MRN: [id number]  : 1950  Today's Date: 24  Time Calculation  Start Time: 1431  Stop Time: 1505  Time Calculation (min): 34 min       Assessment:  OT Assessment: OT Order received, chart reviewed, evaluation completed. Pt demonstrated severe deficits in ADLs and functional transfers/mobility and would benefit from acute OT services to faciliate return to [facility].  Prognosis: Fair  Barriers to Discharge: Inaccessible home environment  Evaluation/Treatment Tolerance: Patient limited by fatigue  Medical Staff Made Aware: Yes  End of Session Communication: Bedside nurse  End of Session Patient Position: Bed, 4 rail up, Alarm on  OT Assessment Results: Decreased ADL status, Decreased upper extremity range of motion, Decreased upper extremity strength, Decreased safe judgment during ADL, Decreased cognition, Decreased endurance, Decreased sensation, Visual deficit, Decreased fine motor control, Decreased functional mobility, Decreased gross motor control, Decreased IADLs, Non-functional right upper extremity  Prognosis: Fair  Barriers to Discharge: Inaccessible home environment  Evaluation/Treatment Tolerance: Patient limited by fatigue  Medical Staff Made Aware: Yes  Strengths: Premorbid level of function  Barriers to Participation: Comorbidities  Plan:  Treatment Interventions: ADL retraining, Visual perceptual retraining, Functional transfer training, UE strengthening/ROM, Endurance training, Cognitive reorientation, Patient/family training, Equipment evaluation/education, Neuromuscular reeducation, Fine motor coordination activities, Compensatory technique education  OT Frequency: 5 times per week  OT Discharge Recommendations: High intensity level of continued care  Equipment Recommended upon Discharge: Lift  OT Recommended Transfer Status: Maximum assist, Assist of 2  OT - OK to Discharge: Yes  Treatment Interventions: ADL  "retraining, Visual perceptual retraining, Functional transfer training, UE strengthening/ROM, Endurance training, Cognitive reorientation, Patient/family training, Equipment evaluation/education, Neuromuscular reeducation, Fine motor coordination activities, Compensatory technique education    Subjective     General:   OT Received On: 06/05/24  General  Reason for Referral: activities of daily living, NSTEMI, CVA. Pt is a 73 yo woman admit with seizure like activity, found to have NSTEMI and acute CVA.  Referred By: Rja Gerard PA-C  Past Medical History Relevant to Rehab: Subarachnoid hemorrhage repair, lung cancer with lymphatic spread, HTN  Missed Visit: No  Missed Visit Reason: Other (Comment)  Family/Caregiver Present: Yes  Caregiver Feedback: multiple family members present  Co-Treatment: PT  Co-Treatment Reason: Need for 2nd skilled person for threrapeutic handling during functional mobility to optimize functional mobility and safety.  Prior to Session Communication: Bedside nurse  Patient Position Received: Bed, 4 rail up, Alarm on  Preferred Learning Style: verbal  General Comment: RN cleared patient for treatment.  Precautions:  Hearing/Visual Limitations: appears to have R sided visual deficits, wears glasses  Medical Precautions: Fall precautions, Seizure precautions  Precautions Comment: aspiration precautions  Vital Signs:     Pain:  Pain Assessment  Pain Assessment: 0-10  Pain Score: 0 - No pain    Objective   Cognition:  Overall Cognitive Status: Impaired (aphasia;  right side neglect;  appears to have right visual field deficits;  impulsive;  inconsistent appropriate \"yes/no\" responses nodding head)  Arousal/Alertness: Inconsistent responses to stimuli  Orientation Level:  (Pt with severe aphasia unable to assess)  Safety Judgment: Decreased awareness of need for assistance  Cognition Comments: Pt able to nod yes/no inconsistently to simple questions.  Insight: Severe  Impulsive: Severely         "   Home Living:  Type of Home: House  Lives With: Spouse  Home Adaptive Equipment: None  Home Layout: One level  Home Access: Stairs to enter without rails  Entrance Stairs-Rails: None  Entrance Stairs-Number of Steps: 1  Bathroom Shower/Tub: Tub/shower unit  Prior Function:  Level of [place]: Independent with ADLs and functional transfers, Independent with homemaking with ambulation  Receives Help From: Family  ADL Assistance: Independent  Homemaking Assistance: Independent  Ambulatory Assistance: Independent  IADL History:     ADL:  Eating Assistance: Total  Eating Deficit: NPO  Grooming Assistance: Total  Bathing Assistance: Total  UE Dressing Assistance: Total  LE Dressing Assistance: Total  Toileting Assistance with Device: Total  Functional Assistance: Total  ADL Comments: Pt with severe aphasia, unable to follow commands,  R sided hemiplegia and neglect    Activity Tolerance:  Endurance: Decreased tolerance for upright activites  Activity Tolerance Comments: fatigue  Functional Standing Tolerance:     Bed Mobility/Transfers: Bed Mobility  Bed Mobility: Yes  Bed Mobility 1  Bed Mobility 1: Supine to sitting  Level of Assistance 1: Maximum assistance, +2  Bed Mobility Comments 1: Pt able to initiate L LE to EOB with assist, max A x2 for B Les and truk up to EOb and dep scoot to EOB. Max A x1-2 to maintain static sitting balance as pt pushes trunk in all directions impulsively  Bed Mobility 2  Bed Mobility  2: Sitting to supine  Level of Assistance 2: Maximum assistance, +2  Bed Mobility Comments 2: Assist for B LEs and trunk into supine, dep scoot to HOB.    Transfers  Transfer: Yes  Transfer 1  Transfer From 1: Sit to  Transfer to 1: Stand  Technique 1: Sit to stand  Transfer Level of Assistance 1: Arm in arm assistance, Maximum assistance, +2  Trials/Comments 1: max Assist x2 and mod VCs for trunk elevation with B LEs blocked and max A x2 to maintain brief stand.  Transfers 2  Transfer From 2: Stand  to  Transfer to 2: Sit  Technique 2: Stand to sit  Transfer Level of Assistance 2: Arm in arm assistance, Maximum assistance, +2  Trials/Comments 2: Assist to control descent to EOB with max A x2 due to pt difficulty following commands    Sitting Balance:  Static Sitting Balance  Static Sitting-Balance Support: Left upper extremity supported  Static Sitting-Level of Assistance: Maximum assistance (x1-2)  Static Sitting-Comment/Number of Minutes: Pt pushing trunk in all directions impulsively, severe R sided weakness, max A x1-2 to maintain static sitting balance and max A for hand positioning for trunk support. Max VCs for positioning and safety    Sensation:  Light Touch: Partial deficits in the RUE, Partial deficits in the RLE (Pt able to nod affirmatively when asked for light touch of R side intermittently, may have some deficits)  Strength:  Strength Comments: R UE 0/5, L UE 4/5  Other Activity:     Home Environment:     Perception:  Inattention/Neglect: Cues to attend right visual field, Cues to attend to right side of body, Cues to maintain midline in sitting  Initiation: Cues to initiate tasks  Coordination:  Movements are Fluid and Coordinated: No  Upper Body Coordination: flaccid R UE and non purposeful movements with L UE   Hand Function:  Hand Function  Gross Grasp: Functional  Coordination: Functional  Extremities: RUE   RUE :  (flaccid, 0/5) and LUE   LUE: Within Functional Limits    Outcome Measures: [facility] Daily Activity  Putting on and taking off regular lower body clothing: Total  Bathing (including washing, rinsing, drying): Total  Putting on and taking off regular upper body clothing: Total  Toileting, which includes using toilet, bedpan or urinal: Total  Taking care of personal grooming such as brushing teeth: Total  Eating Meals: Total  Daily Activity - Total Score: 6        Education Documentation  Body Mechanics, taught by Roxanne Brandt OT at 6/5/2024  3:54 PM.  Learner: Family  Readiness:  Acceptance  Method: Explanation  Response: Verbalizes Understanding  Comment: Family edu on OT POC    Precautions, taught by Roxanne Brandt OT at 6/5/2024  3:54 PM.  Learner: Family  Readiness: Acceptance  Method: Explanation  Response: Verbalizes Understanding  Comment: Family edu on OT POC    Home Exercise Program, taught by Roxanne Brandt OT at 6/5/2024  3:54 PM.  Learner: Family  Readiness: Acceptance  Method: Explanation  Response: Verbalizes Understanding  Comment: Family edu on OT POC    ADL Training, taught by Roxanne Brandt OT at 6/5/2024  3:54 PM.  Learner: Family  Readiness: Acceptance  Method: Explanation  Response: Verbalizes Understanding  Comment: Family edu on OT POC    Goals:  Encounter Problems       Encounter Problems (Active)       OT Goals       ADLs (Progressing)       Start:  06/05/24    Expected End:  06/29/24       Pt will complete ADL tasks with Mod A, using AE as needed, in order to complete self-care tasks.             Functional Transfers (Progressing)       Start:  06/05/24    Expected End:  06/29/24       Pt will perform functional transfers at mod A level with LRAD.           Neuro re-education (Progressing)       Start:  06/05/24    Expected End:  06/29/24       Pt will participate in neuro re-education of R UE to facilitate return of functional use.

## 2024-06-05 NOTE — PROGRESS NOTES
Karen Garcia is a 74 y.o. female on day 1 of admission presenting with STEMI (ST elevation myocardial infarction) (Multi).      Subjective   Nonverbal.  Discussed with RN.  Patient is able to answer no question.  Denied pain or shortness of breath.       Objective     Last Recorded Vitals  /59 (BP Location: Right arm, Patient Position: Lying)   Pulse 80   Temp 36 °C (96.8 °F) (Temporal)   Resp 22   Wt 78.2 kg (172 lb 6.4 oz)   SpO2 96%   Intake/Output last 3 Shifts:    Intake/Output Summary (Last 24 hours) at 6/5/2024 0821  Last data filed at 6/5/2024 0400  Gross per 24 hour   Intake 100 ml   Output 1175 ml   Net -1075 ml       Admission Weight  Weight: 83.1 kg (183 lb 3.2 oz) (06/04/24 0022)    Daily Weight  06/05/24 : 78.2 kg (172 lb 6.4 oz)  Constitutional:       General: She is awake. She is not in acute distress.  HENT:      Head: Normocephalic and atraumatic.   Eyes:      Conjunctiva/sclera: Conjunctivae normal.   Cardiovascular:      Rate and Rhythm: Normal rate and regular rhythm.      Pulses:           Radial pulses are 2+ on the right side and 2+ on the left side.        Posterior tibial pulses are 2+ on the right side and 2+ on the left side.   Pulmonary:      Effort: Pulmonary effort is normal.      Breath sounds: Normal breath sounds and air entry.   Abdominal:      General: Bowel sounds are normal.      Palpations: Abdomen is soft.   Genitourinary:     Comments: Knight catheter in place  Musculoskeletal:      Right lower leg: No edema.      Left lower leg: No edema.   Skin:     General: Skin is warm and dry.      Capillary Refill: Capillary refill takes less than 2 seconds.   Neurological:      GCS: GCS eye subscore is 4. GCS verbal subscore is 2. GCS motor subscore is 4.      Comments: Tongue deviates to right side, no movement to RUE, intermittent twitching to RLE     -LUE moves freely  -LLE withdraws to pain   Image Results  ECG 12 lead  Wide QRS rhythm  Nonspecific intraventricular  block  Cannot rule out Anteroseptal infarct , age undetermined  T wave abnormality, consider inferolateral ischemia  Abnormal ECG  When compared with ECG of 03-JUN-2024 23:43, (unconfirmed)  Previous ECG has undetermined rhythm, needs review  Confirmed by Kin Miller ([id number]) on 6/4/2024 3:15:35 PM  ECG 12 lead  Poor data quality, interpretation may be adversely affected  Undetermined rhythm  Anteroseptal infarct , age undetermined  Marked ST abnormality, possible inferior subendocardial injury  Abnormal ECG  When compared with ECG of 27-DEC-1996 15:11,  Significant changes have occurred  Confirmed by Kin Miller ([id number]) on 6/4/2024 3:14:43 PM  XR chest 1 view  Narrative: Interpreted By:  [place] Stubbs,   STUDY:  XR CHEST 1 VIEW;  6/4/2024 12:03 pm      INDICATION:  Signs/Symptoms:ng placement.      COMPARISON:  06/04/2024 at 1:00 a.m.      ACCESSION NUMBER(S):  [id number]      ORDERING CLINICIAN:  TREVOR MANCIA      TECHNIQUE:  The      FINDINGS:  The cardiac silhouette appears prominent.      There is dense consolidation in the right midlung. There may also be  right pleural fluid.      A nasogastric tube terminates below the diaphragm. A venous catheter  on the left terminates in the region of the superior vena cava.      There are degenerative changes of the spine.      COMPARISON OF FINDING:  The nasogastric tube was placed in the interval.      Impression: Interval nasogastric tube placement. Dense consolidation on the right.      MACRO:  none      Signed by: [place] Stubbs 6/4/2024 1:11 PM  Dictation workstation:   [id number]  Transthoracic Echo (TTE) [place] [address]             Phone [phone]    TRANSTHORACIC ECHOCARDIOGRAM REPORT       Patient Name:     KVNG DAVIDSON MAXIMO Marino Physician:   [id number] Dane Harding DO  Study Date:       6/4/2024             Ordering  Provider:   [id number] LUIS PIKE  MRN/PID:          [id number]             Fellow:  Accession#:       [id number]         Nurse:  Date of           1950 / 74 years Sonographer:         Grayson Hooper RDCS  Birth/Age:  Gender:           F                    Additional Staff:  Height:           166.00 cm            Admit Date:  Weight:           78.00 kg             Admission Status:    Inpatient - Routine  BSA / BMI:        1.86 m2 / 28.31      Department Location: [facility]                    kg/m2  Blood Pressure: 135 /76 mmHg    Study Type:    TRANSTHORACIC ECHO (TTE) COMPLETE  Diagnosis/ICD: ST elevation (STEMI) myocardial infarction involving other                 sites-I21.29  Indication:    STEMI  CPT Codes:     Echo Complete w Full Doppler-[id number]    Patient History:  Pertinent History: COPD, CHF, CAD, HTN and Hyperlipidemia. Lung ca,STEMI.    Study Detail: The following Echo studies were performed: 2D, M-Mode, Doppler and                color flow. Technically challenging study due to poor acoustic                windows and patient lying in supine position.       PHYSICIAN INTERPRETATION:  Left Ventricle: Left ventricular systolic function is normal, with an estimated ejection fraction of 55-60%. There are no regional wall motion abnormalities. The left ventricular cavity size is normal. Left ventricular diastolic filling was indeterminate.  Left Atrium: The left atrium is normal in size.  Right Ventricle: The right ventricle is normal in size. There is normal right ventricular global systolic function.  Right Atrium: The right atrium was not well visualized.  Aortic Valve: The aortic valve is trileaflet. There is no evidence of aortic valve regurgitation. The peak instantaneous gradient of the aortic valve is 6.4 mmHg. The mean gradient of the aortic valve is 3.5 mmHg.  Mitral Valve: The mitral valve is normal in structure. There is no evidence of mitral valve regurgitation.  Tricuspid Valve: The  tricuspid valve was not well visualized. Tricuspid regurgitation was not assessed.  Pulmonic Valve: The pulmonic valve is not well visualized. The pulmonic valve regurgitation was not well visualized.  Pericardium: There is no pericardial effusion noted.  Aorta: The aortic root was not well visualized.       CONCLUSIONS:   1. Left ventricular systolic function is normal with a 55-60% estimated ejection fraction.   2. Poorly visualized anatomical structures due to suboptimal image quality.    QUANTITATIVE DATA SUMMARY:  2D MEASUREMENTS:                           Normal Ranges:  IVSd:          1.08 cm   (0.6-1.1cm)  LVPWd:         1.00 cm   (0.6-1.1cm)  LVIDd:         4.02 cm   (3.9-5.9cm)  LVIDs:         3.44 cm  LV Mass Index: 72.9 g/m2  LV % FS        14.6 %    LA VOLUME:                      Normal Ranges:  LA Vol A4C: 44.2 ml    RA VOLUME BY A/L METHOD:                        Normal Ranges:  RA Area A4C: 17.3 cm2    LV SYSTOLIC FUNCTION BY 2D PLANIMETRY (MOD):                      Normal Ranges:  EF-A4C View: 54.1 % (>=55%)  EF-A2C View: 42.6 %  EF-Biplane:  44.5 %    LV DIASTOLIC FUNCTION:                      Normal Ranges:  MV Peak E: 0.86 m/s (0.7-1.2 m/s)  MV Peak A: 0.02 m/s (0.42-0.7 m/s)  E/A Ratio: 41.95    (1.0-2.2)    MITRAL VALVE:                  Normal Ranges:  MV DT: 140 msec (150-240msec)    AORTIC VALVE:                                    Normal Ranges:  AoV Vmax:                1.27 m/s (<=1.7m/s)  AoV Peak P.4 mmHg (<20mmHg)  AoV Mean PG:             3.5 mmHg (1.7-11.5mmHg)  LVOT Max Josesito:            0.64 m/s (<=1.1m/s)  AoV VTI:                 23.27 cm (18-25cm)  LVOT VTI:                13.24 cm  LVOT Diameter:           1.90 cm  (1.8-2.4cm)  AoV Area, VTI:           1.57 cm2 (2.5-5.5cm2)  AoV Area,Vmax:           1.39 cm2 (2.5-4.5cm2)  AoV Dimensionless Index: 0.57       RIGHT VENTRICLE:  RV Basal 3.80 cm  RV Mid   3.30 cm  RV Major 5.7 cm    TRICUSPID VALVE/RVSP:                               Normal Ranges:  Peak TR Velocity: 1.98 m/s  RV Syst Pressure: 30.7 mmHg (< 30mmHg)  IVC Diam:         2.40 cm       [id number] Dane Harding DO  Electronically signed on 6/4/2024 at 1:10:18 PM       ** Final **  XR chest 1 view  Narrative: Interpreted By:  Luis Merida,   STUDY:  XR CHEST 1 VIEW;  6/4/2024 1:01 am      INDICATION:  Signs/Symptoms:co.      COMPARISON:  12/25/2022      ACCESSION NUMBER(S):  [id number]      ORDERING CLINICIAN:  KP ZAMARRIPA      FINDINGS:  There is a focal masslike consolidation infiltrate in the right  midlung measuring 4.2 cm x 5.5 cm. This demonstrates central lucency.  This is surrounded by more ill-defined airspace disease. The left  lung is clear. There is a left chest port terminating over the right  atrium. The heart is enlarged. Aorta is ectatic. No pneumothorax.      Impression: Focal masslike opacity projecting of the right midlung measuring 5.5  x 4.4 cm with a central lucency consistent with known lung cancer.          MACRO:  None.      Signed by: Luis Merida 6/4/2024 1:23 AM  Dictation workstation:   [id number]  CT brain attack angio head and neck W and WO IV contrast  Narrative: Interpreted By:  Luis Merida,   STUDY:  CT BRAIN ATTACK ANGIO HEAD AND NECK W AND WO IV CONTRAST;  6/4/2024  12:33 am      INDICATION:  Signs/Symptoms:droop, ams      ACCESSION NUMBER(S):  [id number]      ORDERING CLINICIAN:  KP ZAMARRIPA      TECHNIQUE:  Following IV contrast administration of 75 ML iodinated contrast, a  CT angiography of the head and neck was performed. MIPS and 3D  reconstructions of the [place] and neck were created on an  independent workstation and reviewed.      FINDINGS:  Emphysema at the lung apices. Moderate size right pleural effusion.          CTA NECK:              LEFT VERTEBRAL ARTERY: Mild atherosclerosis at the origin. No  hemodynamically significant stenosis, occlusion, or dissection.      LEFT COMMON/INTERNAL CAROTID  ARTERY: There is at least mild stenosis  of the left common carotid artery origin, evaluation limited due to  motion artifact. The remainder of the left common carotid artery is  widely patent. Just distal to the left carotid bulb there is complete  occlusion of the entire extracranial left ICA extending to the  intracranial the ICA.      RIGHT VERTEBRAL ARTERY: Non dominant. No hemodynamically significant  stenosis, occlusion, or dissection.      RIGHT COMMON/INTERNAL CAROTID ARTERY: Atherosclerosis at the bulb and  proximal postbulbar ICA. No hemodynamically significant stenosis,  occlusion, or dissection.          The neck soft tissues show no evidence of mass, fluid collection, or  enlarged lymph nodes. There is no acute osseous abnormality.              CTA HEAD:      ANTERIOR CIRCULATION:      - Internal Carotid Arteries: Complete occlusion of the LEFT petrous,  cavernous, clinoid, and ophthalmic ICA segments. Reconstitution of  flow and normal caliber at the left communicating segment. Aneurysm  clip at the left ophthalmic artery. Intracranial right ICA widely  patent with minimal calcific atherosclerosis.      - Middle Cerebral Arteries: Aneurysm clip at the left MCA  bifurcation. The left M1 segment is patent. There is an occluded M2  left MCA branch as well as numerous left M3 branches in both superior  and inferior divisions, both demonstrating a paucity of opacification  when compared to the right side throughout the M 4 segments. On the  right, no hemodynamically significant stenosis or occlusion.      - Anterior Cerebral Arteries:  No hemodynamically significant  stenosis or occlusion.          POSTERIOR CIRCULATION:      - Intracranial Vertebral Arteries:  No hemodynamically significant  stenosis or occlusion.      - Basilar Artery:  No hemodynamically significant stenosis or  occlusion.      - Posterior Cerebral Arteries:  No hemodynamically significant  stenosis or occlusion.      No arteriovenous  malformation is visualized.  No pathologic intracranial enhancement or discrete mass.  The dural venous sinuses are patent.      MIPS and 3D reconstructions confirm the above findings.      Impression: 1. Left M1 segment is patent. There is an occluded M2 left MCA branch  as well as numerous left M3 branches in both superior and inferior  divisions, both demonstrating a paucity of opacification when  compared to the right side throughout the M4 segments, particularly  in the inferior division.      2. Complete occlusion of the left ICA from the bulb to the ophthalmic  segment with reconstitution and normalization of caliber at the  communicating segment. This is age indeterminate.      3. No evidence of intracranial aneurysm. 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      MACRO:  Luis Merida discussed the significance and urgency of this  critical finding by telephone with  KP ZAMARRIPA on 6/4/2024 at 12:25  a.m.. (**-RCF-**) Findings:  See findings.      Signed by: Luis Merida 6/4/2024 1:22 AM  Dictation workstation:   [id number]  CT brain attack head wo IV contrast  Narrative: Interpreted By:  Luis Merida,   STUDY:  CT BRAIN ATTACK HEAD WO IV CONTRAST;  6/4/2024 12:05 am      INDICATION:  Signs/Symptoms:Stroke Evaluation.      COMPARISON:  None.      ACCESSION NUMBER(S):  [id number]      ORDERING CLINICIAN:  KP ZAMARRIPA      TECHNIQUE:  Noncontrast axial CT images of head were obtained with coronal and  sagittal reconstructed images.      FINDINGS:  BRAIN PARENCHYMA: Evaluation is limited due to extensive motion  artifact and beam hardening artifact from postsurgical changes in the  left middle cranial fossa and suprasellar cistern. There is subtle  hypoattenuation in the left frontal lobe involving the cortical gray  white junction raising concern for early ischemic change. There is  chronic gliotic changes within the left basal ganglia,  left  subinsular white matter, and subcortical white matter of left  parietal lobe. There is no acute intracranial hemorrhage or mass  effect.      VENTRICLES and EXTRA-AXIAL SPACES: There is a hyperdense artery sign  in the left sylvian fissure representing an M2/M3 vessel. Aneurysm  clips adjacent to the anterior left clinoid process and in the left  sylvian fissure are noted. No evidence of acute extra-axial  hemorrhage. The ventricles are normal in caliber.      PARANASAL SINUSES/MASTOIDS:  No hemorrhage or air-fluid levels within  the visualized paranasal sinuses. The mastoids are well aerated.      CALVARIUM/ORBITS: Left frontotemporal craniotomy. No skull fracture.  The orbits and globes are intact to the extent visualized.      EXTRACRANIAL SOFT TISSUES: No discernible abnormality.      Impression: 1. Hyperdense left M2/M3 vessel suspicious for an acute intraluminal  thrombus. Concern for early loss of gray-white differentiation  involving the left frontal lobe reflecting acute ischemic infarct.      2. No acute intracranial hemorrhage.      3. 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      4. Gliotic changes in the subinsular white matter and left basal  ganglia.      MACRO:  Luis Merida discussed the significance and urgency of this  critical finding by [facility] with  KP ZAMARRIPA on 6/4/2024 at 12:29  am with confirmation of receipt .  (**-F-**) Findings:  See  findings.      Signed by: Luis Merida 6/4/2024 12:32 AM  Dictation workstation:   [id number]      Physical Exam    Relevant Results               Assessment/Plan                  Principal Problem:    STEMI (ST elevation myocardial infarction) (Multi)  Essential hypertension  -anterior ST elevation noted on the EKG.   -taken to cardiac catheterization by cardiologist Dr. Harding.   -Mild non-obstructive CAD was noted, with no stent placement required.     Acute thrombotic CVA  Hx of  subarachnoid hemorrhage s/p repair in 1996   -On presentation aphasic and appeared to have a right sided facial droop   -CT head with hyperdense left M2/M3 vessel suspicious for an acute intraluminal thrombus, concern for early loss of gray-white differentiation involving the left frontal lobe reflecting acute ischemic infarct.   CT amgio head/neck with left M1 segment is patent. There is an occluded M2 left MCA branch as well as numerous left M3 branches in both superior and inferior divisions, both demonstrating a paucity of opacification when compared to the right side throughout the M4 segments, particularly in the inferior division. Complete occlusion of the left ICA from the bulb to the ophthalmic segment with reconstitution and normalization of caliber at the communicating segment.   -Telestroke consulted, and pt is not a candidate for tPA or endovascular treatment.   -Aspirin and IV heparin  -Physical, occupational, and speech therapy    COPD  Lung cancer on immunotherapy  Room air  DuoNebs    Dysphagia CVA  -Speech therapy  -Tube feeding    Transaminitis  -Holding statin    Ethics/Code Status:  -DNR CCA    Knight catheter  Alternate nutrition should be considered versus hospice context of advanced lung cancer.  Awaiting neurology input.  Probably will require DOAC at the time of discharge given malignancy and immunotherapy.    Nicola Acevedo MD

## 2024-06-05 NOTE — CARE PLAN
The patient's goals for the shift include  FRANKIE    The clinical goals for the shift include FRANKIE    Over the shift, the patient did not make progress toward the following goals. Barriers to progression include mental status, expressive aphasia. Recommendations to address these barriers include continue current care, and maximize comfort.

## 2024-06-05 NOTE — PROGRESS NOTES
Physical Therapy    Physical Therapy Treatment    Patient Name: Karen Garcia  MRN: [id number]  Today's Date: 6/5/2024  Time Calculation  Start Time: 1431  Stop Time: 1500  Time Calculation (min): 29 min    Assessment/Plan   PT Assessment  Rehab Prognosis: Good  Evaluation/Treatment Tolerance: Patient limited by fatigue  End of Session Communication: Bedside nurse  Assessment Comment: Patient able to stand with max assist x persons this date;  significant right side strength deficits;  high fall risk.  End of Session Patient Position: Bed, 4 rail up, Alarm on  PT Plan  Inpatient/Swing Bed or Outpatient: Inpatient  PT Plan  Treatment/Interventions: Bed mobility, Transfer training, Balance training, Strengthening, Endurance training, Therapeutic exercise, Therapeutic activity  PT Plan: Skilled PT  PT Frequency: 5 times per week  PT Discharge Recommendations: High intensity level of continued care (pending definitive goals of care.)  PT Recommended Transfer Status: Assist x2  PT - OK to Discharge: Yes      General Visit Information:   PT  Visit  PT Received On: 06/05/24  General  Missed Visit: Yes  Missed Visit Reason: Other (Comment) (Bedside nurse reports EKG changes being assessed and recommends hold therapy treatment this AM.)  Co-Treatment: OT  Co-Treatment Reason: Need for 2nd skilled person for threrapeutic handling during functional mobility to optimize functional mobility and safety.  Prior to Session Communication: Bedside nurse  Patient Position Received: Bed, 4 rail up, Alarm on  General Comment: RN cleared patient for treatment.    Subjective   Precautions:  Precautions  Medical Precautions: Fall precautions, Seizure precautions      Objective   Pain:  Pain Assessment  Pain Assessment: FLACC (Face, Legs, Activity, Cry, Consolability)  Pain Score: 0 - No pain  Cognition:  Cognition  Overall Cognitive Status: Impaired (aphasia;  right side neglect;  right visual field deficits?;  impulsive;  inconsistent  "appropriate \"yes/no\" responses via gesture;)  Coordination:  Movements are Fluid and Coordinated: No  Lower Body Coordination: flaccid right LE  Postural Control:  Static Sitting Balance  Static Sitting-Balance Support: Left upper extremity supported  Static Sitting-Level of Assistance: Maximum assistance  Static Sitting-Comment/Number of Minutes: Mod-max assist required to maintain midline while sitting on side of bed due to decreased balance all planes;  patient pushes to right side with left UE while sitting.  Static Standing Balance  Static Standing-Balance Support: Bilateral upper extremity supported  Static Standing-Level of Assistance: Maximum assistance (x 2)  Static Standing-Comment/Number of Minutes: Assist with trunk support, balance, and right LE support during static standing without assistive device arm-in-arm assist x ~10 seconds.  Extremity/Trunk Assessments:  Strength RLE  R Hip Flexion: 0/5  R Knee Extension: 0/5  R Ankle Dorsiflexion: 0/5  Strength LLE  L Hip Flexion: 3/5  L Knee Extension: 3/5  L Ankle Dorsiflexion: 3/5  Activity Tolerance:  Activity Tolerance  Endurance: Decreased tolerance for upright activites  Activity Tolerance Comments: Fatigue  Treatments:       Therapeutic Activity  Therapeutic Activity Performed: Yes  Therapeutic Activity 1: Sitting balance activities while sitting on side of bed maintaining midline with assist x ~7 minutes.  Therapeutic Activity 2: Static standing with max assist x 2 for trunk support, balance, and right LE support x ~10 seconds;  patient appeared to support body weight with left LE;  patient was unable to support body weight with right LE.         Bed Mobility  Bed Mobility: Yes  Bed Mobility 1  Bed Mobility 1: Supine to sitting  Level of Assistance 1: Maximum assistance (x 2)  Bed Mobility Comments 1: Assist with trunk-up and brandi LE during supine to sit;  max assist to scoot hips to edge of bed during supine to sit.  Bed Mobility 2  Bed Mobility  2: " Sitting to supine  Level of Assistance 2: Maximum assistance (x 2)  Bed Mobility Comments 2: Assist with trunk and brandi LE during sit to supine.    Ambulation/Gait Training  Ambulation/Gait Training Performed: No  Transfers  Transfer: Yes  Transfer 1  Transfer From 1: Sit to  Transfer to 1: Stand  Technique 1: Sit to stand  Transfer Level of Assistance 1: Arm in arm assistance, Maximum assistance (x 2)  Trials/Comments 1: Assist with elevating buttocks from sitting surface,positioning COG over COREY, and supporting right LE during sit to stand;  Transfers 2  Transfer From 2: Stand to  Transfer to 2: Sit  Technique 2: Stand to sit  Transfer Level of Assistance 2: Arm in arm assistance, Maximum assistance (x 2)  Trials/Comments 2: Assist with trunk and brandi LE         Outcome Measures:  [facility] Basic Mobility  Turning from your back to your side while in a flat bed without using bedrails: A lot  Moving from lying on your back to sitting on the side of a flat bed without using bedrails: A little  Moving to and from bed to chair (including a wheelchair): A lot  Standing up from a chair using your arms (e.g. wheelchair or bedside chair): Total  To walk in hospital room: Total  Climbing 3-5 steps with railing: Total  Basic Mobility - Total Score: 10    Education Documentation  No documentation found.  Education Comments  No comments found.        OP EDUCATION:       Encounter Problems       Encounter Problems (Active)       Balance       Patient will maintain midline while sitting on side of bed with min assist. (Progressing)       Start:  06/04/24    Expected End:  06/18/24               Mobility       Bed mobility including supine to sit and sit to supine with min assist. (Progressing)       Start:  06/04/24    Expected End:  06/18/24            Transfer including sit to stand and stand to sit with mod assist x 2. (Progressing)       Start:  06/04/24    Expected End:  06/18/24            Transfers including stand pivot bed to  chair and stand pivot chair to bed with mod assist x 2.       Start:  06/04/24    Expected End:  06/18/24

## 2024-06-05 NOTE — CONSULTS
"Pulmonary Consult Note    Chief Complaint   Patient presents with    Stroke        Reason for consultation:  Lung mass    History Of Present Illness  Karen Garcia is a 74 y.o. female presenting with altered mental status and seizure-like activity.  She has a known history of lung cancer with known lymphatic spread and is currently getting immunotherapy through the [facility].  No history is forthcoming from the patient given that she is altered.  Were asked to see her for this lung mass.     Past Medical History  COPD  Non-small cell lung cancer of the right lung, stage IIIC, T3N3M0, patient is currently receiving immunotherapy (Durvalamb consolidation with Cycle 2 on 5/20/24) and radiation therapy. Last chest CT on 4/11/24 report suggesting decrease in size of subcarinal and slight increase right lung mass      Surgical History  She has a past surgical history that includes Cardiac catheterization (N/A, 6/4/2024) and Cardiac catheterization (N/A, 6/4/2024).     Social History  She reports that she has never smoked. She has never been exposed to tobacco smoke. She has never used smokeless tobacco. She reports that she does not drink alcohol and does not use drugs.    Family History  No family history on file.     Allergies  Patient has no known allergies.    Review of Systems   Unable to perform ROS: Mental status change       Last Recorded Vitals  Blood pressure 129/59, pulse 80, temperature 36 °C (96.8 °F), temperature source Temporal, resp. rate 22, height 1.676 m (5' 6\"), weight 78.2 kg (172 lb 6.4 oz), SpO2 96%.     Physical Exam  Vitals reviewed.   Constitutional:       General: She is awake.      Comments: confused   HENT:      Head: Normocephalic and atraumatic.      Mouth/Throat:      Mouth: Mucous membranes are moist.   Eyes:      General: No scleral icterus.     Conjunctiva/sclera: Conjunctivae normal.   Cardiovascular:      Rate and Rhythm: Rhythm irregular.      Pulses: Normal pulses. "   Pulmonary:      Effort: Pulmonary effort is normal. No respiratory distress.      Breath sounds: Normal breath sounds. No wheezing.   Abdominal:      Palpations: Abdomen is soft.   Musculoskeletal:      Right lower leg: No edema.      Left lower leg: No edema.   Skin:     General: Skin is warm and dry.   Neurological:      Mental Status: She is disoriented.      Comments: Probable right-sided weakness         Meds  Scheduled medications  aspirin, 81 mg, nasogastric tube, Daily  aspirin, 150 mg, rectal, Daily  enoxaparin, 40 mg, subcutaneous, q24h  insulin lispro, 0-5 Units, subcutaneous, q4h  levETIRAcetam, 500 mg, intravenous, q12h  perflutren lipid microspheres, 0.5-10 mL of dilution, intravenous, Once in imaging  perflutren protein A microsphere, 0.5 mL, intravenous, Once in imaging  sulfur hexafluoride microsphr, 2 mL, intravenous, Once in imaging      Continuous medications     PRN medications  PRN medications: dextrose, dextrose, glucagon, glucagon, hydrALAZINE **FOLLOWED BY** [START ON 6/6/2024] hydrALAZINE, labetaloL, nitroglycerin, polyethylene glycol       Relevant Results  CHEM comprehensive panel reviewed and notable for mild transaminitis, serum bicarbonate of 20  CBC reviewed and notable for mildly elevated white count of 12,000  Portable technique chest x-ray known right-sided mass.  Mediport in place      Principal Problem:    STEMI (ST elevation myocardial infarction) (Multi)       Recommendations  Non-small cell lung cancer: Currently receiving immunotherapy and radiation therapy  Follow-up with her primary oncologist  Acute pulmonary insufficiency: Currently in the setting of her encephalopathy and stroke.  At this point protecting airway.  Is at risk for aspiration  Trend  Aspiration precautions  Stroke/encephalopathy:  Neurology follow-up  Keppra initiated  DVT prophylaxis      Thank you for this consultation.  Will see again in the morning     Guarded prognosis    Pari De La Rosa  MD  Pulmonary/Critical Care Physician

## 2024-06-05 NOTE — PROGRESS NOTES
Speech-Language Pathology                 Therapy Communication Note    Patient Name: Karen Garcia  MRN: [id number]  Today's Date: 6/5/2024     Discipline: Speech Language Pathology    Missed Visit Reason:  Per RN hold therapy d/t heart issues @ this time    Missed Time: Attempt    Comment:

## 2024-06-05 NOTE — PROGRESS NOTES
"Karen Garcia is a 74 y.o. female on day 1 of admission presenting with STEMI (ST elevation myocardial infarction) (Multi).      Subjective   Patient seen and examined.  Patient or stands right hemiplegia with significant aphasia.  Family at bedside.       Medications  Medications Prior to Admission   Medication Sig Dispense Refill Last Dose    amLODIPine (Norvasc) 2.5 mg tablet Take 1 tablet (2.5 mg) by mouth once daily.       atorvastatin (Lipitor) 10 mg tablet Take 1 tablet (10 mg) by mouth once daily.       benzonatate (Tessalon) 100 mg capsule Take 1 capsule (100 mg) by mouth every 8 hours if needed for cough.       gabapentin (Neurontin) 300 mg capsule Take 1 capsule (300 mg) by mouth 3 times a day.       ipratropium-albuteroL (Duo-Neb) 0.5-2.5 mg/3 mL nebulizer solution Inhale 3 mL every 6 hours if needed for wheezing or shortness of breath.          Review of Systems  14 point review of systems could not be reviewed in detail given the patient's significant aphasia and mental status.    Objective     Last Recorded Vitals  Blood pressure 144/88, pulse 101, temperature 36.1 °C (97 °F), temperature source Temporal, resp. rate 24, height 1.676 m (5' 6\"), weight 78.2 kg (172 lb 6.4 oz), SpO2 93%.    Physical Exam  Neurological Exam   limited neurological examination was obtained given the patient's condition.  Follows simple commands  Physical Exam  Mental Status :  Alert , O x 1       Speech : Significant expressive and receptive aphasia noted     CN 2-12 :  Pupils round , sluggishly reacting to light  Fundus could not be assessed  Right facial asymmetry noted  Hearing acuity intact bilaterally  Tongue midline     Motor:  Strength right-sided weakness at 0 out of 5 and moves left-sided minimally     Sensory:  Withdraws to painful stimuli     Reflexes : 1+  symmetric with equivocal toes     Coordination : Could not be assessed     Gait : Unable to assess.    Relevant Results         Results for orders placed or " performed during the hospital encounter of 06/03/24 (from the past 24 hour(s))   POCT GLUCOSE   Result Value Ref Range    POCT Glucose 108 (H) 74 - 99 mg/dL   POCT GLUCOSE   Result Value Ref Range    POCT Glucose 124 (H) 74 - 99 mg/dL   POCT GLUCOSE   Result Value Ref Range    POCT Glucose 113 (H) 74 - 99 mg/dL   CBC and Auto Differential   Result Value Ref Range    WBC 12.7 (H) 4.4 - 11.3 x10*3/uL    nRBC 0.3 (H) 0.0 - 0.0 /100 WBCs    RBC 3.13 (L) 4.00 - 5.20 x10*6/uL    Hemoglobin 10.1 (L) 12.0 - 16.0 g/dL    Hematocrit 33.3 (L) 36.0 - 46.0 %     (H) 80 - 100 fL    MCH 32.3 26.0 - 34.0 pg    MCHC 30.3 (L) 32.0 - 36.0 g/dL    RDW 15.8 (H) 11.5 - 14.5 %    Platelets 168 150 - 450 x10*3/uL    Neutrophils % 87.6 40.0 - 80.0 %    Immature Granulocytes %, Automated 0.9 0.0 - 0.9 %    Lymphocytes % 3.7 13.0 - 44.0 %    Monocytes % 7.4 2.0 - 10.0 %    Eosinophils % 0.1 0.0 - 6.0 %    Basophils % 0.3 0.0 - 2.0 %    Neutrophils Absolute 11.08 (H) 1.60 - 5.50 x10*3/uL    Immature Granulocytes Absolute, Automated 0.12 0.00 - 0.50 x10*3/uL    Lymphocytes Absolute 0.47 (L) 0.80 - 3.00 x10*3/uL    Monocytes Absolute 0.93 (H) 0.05 - 0.80 x10*3/uL    Eosinophils Absolute 0.01 0.00 - 0.40 x10*3/uL    Basophils Absolute 0.04 0.00 - 0.10 x10*3/uL   Comprehensive Metabolic Panel   Result Value Ref Range    Glucose 125 (H) 65 - 99 mg/dL    Sodium 142 133 - 145 mmol/L    Potassium 4.7 3.4 - 5.1 mmol/L    Chloride 109 (H) 97 - 107 mmol/L    Bicarbonate 20 (L) 24 - 31 mmol/L    Urea Nitrogen 40 (H) 8 - 25 mg/dL    Creatinine 0.80 0.40 - 1.60 mg/dL    eGFR 76 >60 mL/min/1.73m*2    Calcium 9.5 8.5 - 10.4 mg/dL    Albumin 3.2 (L) 3.5 - 5.0 g/dL    Alkaline Phosphatase 114 35 - 125 U/L    Total Protein 5.9 5.9 - 7.9 g/dL     (H) 5 - 40 U/L    Bilirubin, Total 0.6 0.1 - 1.2 mg/dL     (H) 5 - 40 U/L    Anion Gap 13 <=19 mmol/L   Magnesium   Result Value Ref Range    Magnesium 2.60 1.60 - 3.10 mg/dL   Phosphorus   Result  Value Ref Range    Phosphorus 4.7 (H) 2.5 - 4.5 mg/dL   POCT GLUCOSE   Result Value Ref Range    POCT Glucose 112 (H) 74 - 99 mg/dL   POCT GLUCOSE   Result Value Ref Range    POCT Glucose 124 (H) 74 - 99 mg/dL                  Assessment/Plan      Principal Problem:    STEMI (ST elevation myocardial infarction) (Multi)    Patient is 74-year-old female with multiple medical problems chronic lung cancer and undergoing immunotherapy admitted with seizure-like episode with right-sided weakness and aphasia rule out CVA and metastatic disease given her history of lung cancer, prognosis guarded.     Recommendations;  Reviewed  CT scan brain and  CT angiogram head /neck results.  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  Cardiology on board given her history of atrial fibrillation  We will hold off on anticoagulation and will continue antiplatelets   Pulmonary consultation for further evaluation of lung mass  And oncology consultation  Continue Keppra 500 mg twice daily  Sepsis workup and treatment  2 d echo pending  PT/OT eval and treatment  Speech eval and treatment  Neurochecks  Blood pressure / Blood sugar monitoring and control  Telemetry monitoring   Continue current management  Discussed the above plan with the patient / nurse / family   At this time after lengthy discussion with the family, given her significant comorbidities and current clinical condition, family considering hospice.    Parts of this chart have been completed using voice recognition software. Please excuse any errors of transcription.     I spent 50 minutes in the professional and overall care of this patient.      Darryl Carlisle MD

## 2024-06-05 NOTE — PROGRESS NOTES
Physical Therapy                 Therapy Communication Note    Patient Name: Karen Garcia  MRN: [id number]  Today's Date: 6/5/2024     Discipline: Physical Therapy    Missed Visit Reason: Missed Visit Reason: Other (Comment) (Bedside nurse reports EKG changes being assessed and recommends hold therapy treatment this AM.)    Missed Time: Cancel

## 2024-06-05 NOTE — PROGRESS NOTES
Occupational Therapy                 Therapy Communication Note    Patient Name: Karen Garcia  MRN: [id number]  Today's Date: 6/5/2024     Discipline: Occupational Therapy    Missed Visit Reason: Missed Visit Reason: Cancel (Bedside nurse reports EKG changes being assessed and recommends hold therapy treatment this AM)    Missed Time: Attempt    Comment:

## 2024-06-06 VITALS
HEIGHT: 66 IN | RESPIRATION RATE: 13 BRPM | WEIGHT: 172.4 LBS | HEART RATE: 176 BPM | SYSTOLIC BLOOD PRESSURE: 52 MMHG | TEMPERATURE: 96.1 F | BODY MASS INDEX: 27.71 KG/M2 | OXYGEN SATURATION: 91 % | DIASTOLIC BLOOD PRESSURE: 40 MMHG

## 2024-06-06 LAB
25(OH)D3 SERPL-MCNC: 54 NG/ML (ref 31–100)
ANION GAP SERPL CALC-SCNC: 17 MMOL/L
BASOPHILS # BLD AUTO: 0.05 X10*3/UL (ref 0–0.1)
BASOPHILS NFR BLD AUTO: 0.3 %
BUN SERPL-MCNC: 56 MG/DL (ref 8–25)
CALCIUM SERPL-MCNC: 9.7 MG/DL (ref 8.5–10.4)
CHLORIDE SERPL-SCNC: 110 MMOL/L (ref 97–107)
CO2 SERPL-SCNC: 22 MMOL/L (ref 24–31)
CREAT SERPL-MCNC: 0.9 MG/DL (ref 0.4–1.6)
EGFRCR SERPLBLD CKD-EPI 2021: 66 ML/MIN/1.73M*2
EOSINOPHIL # BLD AUTO: 0 X10*3/UL (ref 0–0.4)
EOSINOPHIL NFR BLD AUTO: 0 %
ERYTHROCYTE [DISTWIDTH] IN BLOOD BY AUTOMATED COUNT: 15.9 % (ref 11.5–14.5)
GLUCOSE BLD MANUAL STRIP-MCNC: 116 MG/DL (ref 74–99)
GLUCOSE BLD MANUAL STRIP-MCNC: 119 MG/DL (ref 74–99)
GLUCOSE BLD MANUAL STRIP-MCNC: 121 MG/DL (ref 74–99)
GLUCOSE BLD MANUAL STRIP-MCNC: 123 MG/DL (ref 74–99)
GLUCOSE BLD MANUAL STRIP-MCNC: 126 MG/DL (ref 74–99)
GLUCOSE SERPL-MCNC: 116 MG/DL (ref 65–99)
HCT VFR BLD AUTO: 32.6 % (ref 36–46)
HGB BLD-MCNC: 10.4 G/DL (ref 12–16)
IMM GRANULOCYTES # BLD AUTO: 0.25 X10*3/UL (ref 0–0.5)
IMM GRANULOCYTES NFR BLD AUTO: 1.7 % (ref 0–0.9)
LYMPHOCYTES # BLD AUTO: 0.41 X10*3/UL (ref 0.8–3)
LYMPHOCYTES NFR BLD AUTO: 2.7 %
MAGNESIUM SERPL-MCNC: 2.7 MG/DL (ref 1.6–3.1)
MCH RBC QN AUTO: 32.1 PG (ref 26–34)
MCHC RBC AUTO-ENTMCNC: 31.9 G/DL (ref 32–36)
MCV RBC AUTO: 101 FL (ref 80–100)
MONOCYTES # BLD AUTO: 0.91 X10*3/UL (ref 0.05–0.8)
MONOCYTES NFR BLD AUTO: 6.1 %
NEUTROPHILS # BLD AUTO: 13.32 X10*3/UL (ref 1.6–5.5)
NEUTROPHILS NFR BLD AUTO: 89.2 %
NRBC BLD-RTO: 1.5 /100 WBCS (ref 0–0)
PHOSPHATE SERPL-MCNC: 4.9 MG/DL (ref 2.5–4.5)
PLATELET # BLD AUTO: 181 X10*3/UL (ref 150–450)
POTASSIUM SERPL-SCNC: 4.7 MMOL/L (ref 3.4–5.1)
RBC # BLD AUTO: 3.24 X10*6/UL (ref 4–5.2)
SODIUM SERPL-SCNC: 149 MMOL/L (ref 133–145)
URATE SERPL-MCNC: 11.1 MG/DL (ref 2.5–6.8)
WBC # BLD AUTO: 14.9 X10*3/UL (ref 4.4–11.3)

## 2024-06-06 PROCEDURE — 84100 ASSAY OF PHOSPHORUS: CPT | Performed by: INTERNAL MEDICINE

## 2024-06-06 PROCEDURE — 82306 VITAMIN D 25 HYDROXY: CPT | Performed by: INTERNAL MEDICINE

## 2024-06-06 PROCEDURE — 93010 ELECTROCARDIOGRAM REPORT: CPT | Performed by: INTERNAL MEDICINE

## 2024-06-06 PROCEDURE — 83735 ASSAY OF MAGNESIUM: CPT

## 2024-06-06 PROCEDURE — 2500000004 HC RX 250 GENERAL PHARMACY W/ HCPCS (ALT 636 FOR OP/ED): Performed by: NURSE PRACTITIONER

## 2024-06-06 PROCEDURE — 2500000004 HC RX 250 GENERAL PHARMACY W/ HCPCS (ALT 636 FOR OP/ED): Performed by: INTERNAL MEDICINE

## 2024-06-06 PROCEDURE — 80048 BASIC METABOLIC PNL TOTAL CA: CPT | Performed by: INTERNAL MEDICINE

## 2024-06-06 PROCEDURE — 2500000001 HC RX 250 WO HCPCS SELF ADMINISTERED DRUGS (ALT 637 FOR MEDICARE OP): Performed by: INTERNAL MEDICINE

## 2024-06-06 PROCEDURE — 2500000004 HC RX 250 GENERAL PHARMACY W/ HCPCS (ALT 636 FOR OP/ED): Performed by: REGISTERED NURSE

## 2024-06-06 PROCEDURE — 82947 ASSAY GLUCOSE BLOOD QUANT: CPT | Mod: 91

## 2024-06-06 PROCEDURE — 2500000004 HC RX 250 GENERAL PHARMACY W/ HCPCS (ALT 636 FOR OP/ED)

## 2024-06-06 PROCEDURE — 85025 COMPLETE CBC W/AUTO DIFF WBC: CPT | Performed by: INTERNAL MEDICINE

## 2024-06-06 PROCEDURE — 99497 ADVNCD CARE PLAN 30 MIN: CPT | Performed by: NURSE PRACTITIONER

## 2024-06-06 PROCEDURE — 99223 1ST HOSP IP/OBS HIGH 75: CPT | Performed by: NURSE PRACTITIONER

## 2024-06-06 PROCEDURE — 36415 COLL VENOUS BLD VENIPUNCTURE: CPT | Performed by: INTERNAL MEDICINE

## 2024-06-06 PROCEDURE — 84550 ASSAY OF BLOOD/URIC ACID: CPT | Performed by: INTERNAL MEDICINE

## 2024-06-06 RX ORDER — MORPHINE SULFATE 2 MG/ML
2 INJECTION, SOLUTION INTRAMUSCULAR; INTRAVENOUS ONCE
Status: COMPLETED | OUTPATIENT
Start: 2024-06-06 | End: 2024-06-06

## 2024-06-06 RX ORDER — DIGOXIN 0.25 MG/ML
125 INJECTION INTRAMUSCULAR; INTRAVENOUS DAILY
Status: DISCONTINUED | OUTPATIENT
Start: 2024-06-06 | End: 2024-06-06

## 2024-06-06 RX ORDER — ATROPINE SULFATE 10 MG/ML
1 SOLUTION/ DROPS OPHTHALMIC
Status: DISCONTINUED | OUTPATIENT
Start: 2024-06-06 | End: 2024-06-07 | Stop reason: HOSPADM

## 2024-06-06 RX ORDER — HALOPERIDOL 5 MG/ML
0.5 INJECTION INTRAMUSCULAR ONCE
Status: COMPLETED | OUTPATIENT
Start: 2024-06-06 | End: 2024-06-06

## 2024-06-06 RX ORDER — MORPHINE SULFATE 2 MG/ML
1 INJECTION, SOLUTION INTRAMUSCULAR; INTRAVENOUS EVERY 12 HOURS PRN
Status: DISCONTINUED | OUTPATIENT
Start: 2024-06-06 | End: 2024-06-06

## 2024-06-06 RX ORDER — MORPHINE SULFATE/0.9% NACL/PF 1 MG/ML
.5-1 PLASTIC BAG, INJECTION (ML) INTRAVENOUS CONTINUOUS
Status: DISCONTINUED | OUTPATIENT
Start: 2024-06-06 | End: 2024-06-07 | Stop reason: HOSPADM

## 2024-06-06 RX ORDER — MORPHINE SULFATE 2 MG/ML
2 INJECTION, SOLUTION INTRAMUSCULAR; INTRAVENOUS
Status: DISCONTINUED | OUTPATIENT
Start: 2024-06-06 | End: 2024-06-07 | Stop reason: HOSPADM

## 2024-06-06 RX ORDER — DIAZEPAM 5 MG/ML
2 INJECTION, SOLUTION INTRAMUSCULAR; INTRAVENOUS ONCE
Status: COMPLETED | OUTPATIENT
Start: 2024-06-06 | End: 2024-06-06

## 2024-06-06 RX ORDER — LORAZEPAM 2 MG/ML
1 INJECTION INTRAMUSCULAR EVERY 4 HOURS PRN
Status: DISCONTINUED | OUTPATIENT
Start: 2024-06-06 | End: 2024-06-07 | Stop reason: HOSPADM

## 2024-06-06 RX ADMIN — HALOPERIDOL LACTATE 0.5 MG: 5 INJECTION, SOLUTION INTRAMUSCULAR at 03:03

## 2024-06-06 RX ADMIN — MORPHINE SULFATE 2 MG: 2 INJECTION, SOLUTION INTRAMUSCULAR; INTRAVENOUS at 13:15

## 2024-06-06 RX ADMIN — DIGOXIN 125 MCG: 0.25 INJECTION INTRAMUSCULAR; INTRAVENOUS at 08:59

## 2024-06-06 RX ADMIN — ASPIRIN 150 MG: 300 SUPPOSITORY RECTAL at 09:00

## 2024-06-06 RX ADMIN — MORPHINE SULFATE 2 MG: 2 INJECTION, SOLUTION INTRAMUSCULAR; INTRAVENOUS at 13:42

## 2024-06-06 RX ADMIN — DIAZEPAM 2 MG: 10 INJECTION, SOLUTION INTRAMUSCULAR; INTRAVENOUS at 00:28

## 2024-06-06 RX ADMIN — ENOXAPARIN SODIUM 40 MG: 40 INJECTION SUBCUTANEOUS at 11:44

## 2024-06-06 RX ADMIN — MORPHINE SULFATE 2 MG: 2 INJECTION, SOLUTION INTRAMUSCULAR; INTRAVENOUS at 16:50

## 2024-06-06 RX ADMIN — LORAZEPAM 1 MG: 2 INJECTION INTRAMUSCULAR; INTRAVENOUS at 13:32

## 2024-06-06 RX ADMIN — LEVETIRACETAM 500 MG: 5 INJECTION INTRAVENOUS at 08:46

## 2024-06-06 RX ADMIN — MORPHINE SULFATE 1 MG: 2 INJECTION, SOLUTION INTRAMUSCULAR; INTRAVENOUS at 12:59

## 2024-06-06 RX ADMIN — AMIODARONE HYDROCHLORIDE 150 MG: 1.5 INJECTION, SOLUTION INTRAVENOUS at 13:09

## 2024-06-06 RX ADMIN — Medication 2 MG/HR: at 14:15

## 2024-06-06 ASSESSMENT — PAIN SCALES - GENERAL
PAINLEVEL_OUTOF10: 0 - NO PAIN
PAINLEVEL_OUTOF10: 10 - WORST POSSIBLE PAIN
PAINLEVEL_OUTOF10: 0 - NO PAIN

## 2024-06-06 ASSESSMENT — RESPIRATORY DISTRESS OBSERVATION SCALE (RDOS)
HEART RATE PER MINUTE: 2 - >109 BEATS
ACCESSORY MUSCLE RISE IN CLAVICLE DURING INSPIRATION: 2 - PRONOUNCED RISE
RDOS TOTAL SCORE: 6
RESPIRATORY RATE PER MINUTE: 1 - 19-30 BREATHS
RESTLESS NONPURPOSEFUL MOVEMENTS: 1 - OCCASIONAL, SLIGHT MOVEMENTS
LOOK OF FEAR: 0 - NONE
PARADOXICAL BREATHING PATTERN: 0 - NONE
GRUNTING AT END OF EXPIRATION: 0 - NONE
INVOLUNTARY NASAL FLARING: 0 - NONE

## 2024-06-06 ASSESSMENT — COGNITIVE AND FUNCTIONAL STATUS - GENERAL
WALKING IN HOSPITAL ROOM: TOTAL
CLIMB 3 TO 5 STEPS WITH RAILING: TOTAL
MOVING FROM LYING ON BACK TO SITTING ON SIDE OF FLAT BED WITH BEDRAILS: A LOT
TOILETING: TOTAL
HELP NEEDED FOR BATHING: TOTAL
MOBILITY SCORE: 10
MOVING TO AND FROM BED TO CHAIR: A LOT
PERSONAL GROOMING: TOTAL
DAILY ACTIVITIY SCORE: 6
TURNING FROM BACK TO SIDE WHILE IN FLAT BAD: A LITTLE
DRESSING REGULAR LOWER BODY CLOTHING: TOTAL
DRESSING REGULAR UPPER BODY CLOTHING: TOTAL
STANDING UP FROM CHAIR USING ARMS: TOTAL
EATING MEALS: TOTAL

## 2024-06-06 ASSESSMENT — PAIN - FUNCTIONAL ASSESSMENT
PAIN_FUNCTIONAL_ASSESSMENT: 0-10
PAIN_FUNCTIONAL_ASSESSMENT: WONG-BAKER FACES

## 2024-06-06 ASSESSMENT — PAIN DESCRIPTION - LOCATION: LOCATION: CHEST

## 2024-06-06 ASSESSMENT — PAIN SCALES - WONG BAKER
WONGBAKER_NUMERICALRESPONSE: HURTS WORST
WONGBAKER_NUMERICALRESPONSE: HURTS WORST
WONGBAKER_NUMERICALRESPONSE: NO HURT

## 2024-06-06 NOTE — PROGRESS NOTES
Speech-Language Pathology                 Therapy Communication Note    Patient Name: Karen Garcia  MRN: [id number]  Today's Date: 6/6/2024     Discipline: Speech Language Pathology    Missed Visit Reason:  per RN pt is now  end of life after iteam earlier will discharge sp tx    Missed Time: Cancel    Comment:

## 2024-06-06 NOTE — INDIVIDUALIZED OVERALL PLAN OF CARE NOTE
"Asked to reevaluate by my colleague.  Multiple family members present in the room.    Patient with irregular respirations and shallow mildly tachypneic at times   Remains very tachycardic 130s to 140s when listening apically, seems to be regular.  Presently not on telemetry.  Extremities warm prolonged cap refill time but no mottling noted yet.  Occasional grimacing and wincing per family  Otherwise she's non responsive.     Morphine drip presently at 2 mg/hr.    Discussed with stepdown nurse caring for the patientVania.  2 mg morphine bolus stat x 1 increase morphine drip to 3 mg/hr.  Discussed with family present at the bedside.    BP (!) 52/40   Pulse (!) 176   Temp 35.6 °C (96.1 °F)   Resp 13   Ht 1.676 m (5' 6\")   Wt 78.2 kg (172 lb 6.4 oz)   SpO2 91%   BMI 27.83 kg/m²     DNRCC     Robe Munoz, APRN-CNP   "

## 2024-06-06 NOTE — CONSULTS
Inpatient consult to Palliative Care  Consult performed by: REGGIE Bonner-CNP  Consult ordered by: GINETTE Heath  Reason for consult: GOC          Reason For Consult  Reason for Consult: communication / medical decision making     History Of Present Illness  Karen Garcia is a 74 y.o. female with past medical history of  COPD, Non-small cell lung cancer of the right lung, stage IIIC, T3N3M0, patient is currently receiving immunotherapy (Durvalamb consolidation with Cycle 2 on 5/20/24) and radiation therapy, is presenting with altered mental status with oncern of seizure like activity witnessed by family. Prior to today, pt was having nausea/vomiting and diarrhea for 2 days and seemed to be dehydrated per family. On EMS arrival pt was only responsive to pain. EKG done by EMS was of concern for a STEMI. On arrival to the ED, pt was aphasic and appeared to have a right sided facial droop.  Initial vitals include temp 37.1, pulse 110, resp 18, /109, SpO2 95% on RA.  CMP with glucose 163, albumin 3.4, ALT//159.  CBC with WBC of 9.9 and H/H 10.1/32.1. CT head with hyperdense left M2/M3 vessel suspicious for an acute intraluminal thrombus, concern for early loss of gray-white differentiation involving the left frontal lobe reflecting acute ischemic infarct.  CT amgio head/neck with left M1 segment is patent. There is an occluded M2 left MCA branch as well as numerous left M3 branches in both superior and inferior divisions, both demonstrating a paucity of opacification when compared to the right side throughout the M4 segments, particularly in the inferior division. Complete occlusion of the left ICA from the bulb to the ophthalmic segment with reconstitution and normalization of caliber at the communicating segment. This is age indeterminate.  Telestroke consulted, and pt is not a candidate for tPA or endovascular treatment.  Pt was given 1x dose IV heparin and aspirin suppository in the  ED.  Pt was then taken to cardiac catheterization by cardiologist Dr. Harding.  High-sensitivity troponin was over 8000 indicating myocardial injury.  Cardiac catheterization done due to anterior ST elevation noted on the EKG.  Mild non-obstructive CAD was noted, with no stent placement required.  Pt was brought to the ICU post-op for continued management.  Telestroke consult, IV tPA is not recommended and pt is not a candidate for endovascular treatment.  Echo showing a preserved ejection fraction with no wall motion abnormalities.  Patient failed swallow evaluation remain n.p.o., discussion was conducted with family, decision was made this morning to consult hospice.  Patient had sustained pulse present V. tach with heart rate in the 200s sudden onset this afternoon I team was called, with notation of none verbal signs of distress and discomfort.  Attending service and cardiology at the bedside, CODE STATUS was adjusted to DNR CC, and comfort medications were put in place, we were asked to see this patient to assist with transition to full comfort model of care.  Symptoms (0 - 10, Best to Worst)  [place] Symptom Assessment System  Pain Score: 0 - No pain    BM in last 48 hours? unknown    Emotional/Psychological/Spiritual Needs  NA altered mental status  Serious Illness Conversation  NA altered mental status    Personal/Social History    She reports that she has never smoked. She has never been exposed to tobacco smoke. She has never used smokeless tobacco. She reports that she does not drink alcohol and does not use drugs.    Functional Status    bedbound    Past Medical History  She has a past medical history of COPD (chronic obstructive pulmonary disease) (Multi).    Surgical History  She has a past surgical history that includes Cardiac catheterization (N/A, 6/4/2024) and Cardiac catheterization (N/A, 6/4/2024).     Family History  No family history on file.  Allergies  Patient has no known  "allergies.    Review of Systems   Unable to perform ROS: Mental status change        Constitutional:       General: She is in acute distress.   HENT:      Mouth/Throat:      Mouth: Mucous membranes are dry.      Pharynx: Oropharynx is clear.   Eyes:      Pupils: Pupils are equal, round, and reactive to light.   Cardiovascular:      Rate and Rhythm: Normal rate. Rhythm irregular.      Pulses: Normal pulses.      Heart sounds: Normal heart sounds.   Pulmonary:      Effort: Pulmonary effort is normal. No respiratory distress.      Breath sounds: Normal breath sounds. No wheezing or rhonchi.   Abdominal:      General: Bowel sounds are normal. There is no distension.      Palpations: Abdomen is soft.      Tenderness: There is no abdominal tenderness.   Musculoskeletal:      Cervical back: Normal range of motion.      Right lower leg: Normal. No swelling. No edema.      Left lower leg: Normal. No swelling. No edema.      Comments: Pt unable to move right arm and leg at this time.     Skin:     Capillary Refill: Capillary refill takes less than 2 seconds.   Neurological:      Mental Status: She is alert.      Motor: Weakness present.      Comments: Pt unable to move right upper and lower extremity.  Tongue deviates to the right side.      Last Recorded Vitals  Blood pressure (!) 52/40, pulse (!) 176, temperature 35.6 °C (96.1 °F), resp. rate 13, height 1.676 m (5' 6\"), weight 78.2 kg (172 lb 6.4 oz), SpO2 91%.    Relevant Results  Results for orders placed or performed during the hospital encounter of 06/03/24 (from the past 96 hour(s))   POCT GLUCOSE   Result Value Ref Range    POCT Glucose 171 (H) 74 - 99 mg/dL   CBC and Auto Differential   Result Value Ref Range    WBC 9.9 4.4 - 11.3 x10*3/uL    nRBC 0.2 (H) 0.0 - 0.0 /100 WBCs    RBC 3.11 (L) 4.00 - 5.20 x10*6/uL    Hemoglobin 10.1 (L) 12.0 - 16.0 g/dL    Hematocrit 32.1 (L) 36.0 - 46.0 %     (H) 80 - 100 fL    MCH 32.5 26.0 - 34.0 pg    MCHC 31.5 (L) 32.0 - " 36.0 g/dL    RDW 16.1 (H) 11.5 - 14.5 %    Platelets 176 150 - 450 x10*3/uL    Neutrophils % 84.0 40.0 - 80.0 %    Immature Granulocytes %, Automated 1.0 (H) 0.0 - 0.9 %    Lymphocytes % 6.6 13.0 - 44.0 %    Monocytes % 7.6 2.0 - 10.0 %    Eosinophils % 0.5 0.0 - 6.0 %    Basophils % 0.3 0.0 - 2.0 %    Neutrophils Absolute 8.33 (H) 1.60 - 5.50 x10*3/uL    Immature Granulocytes Absolute, Automated 0.10 0.00 - 0.50 x10*3/uL    Lymphocytes Absolute 0.65 (L) 0.80 - 3.00 x10*3/uL    Monocytes Absolute 0.75 0.05 - 0.80 x10*3/uL    Eosinophils Absolute 0.05 0.00 - 0.40 x10*3/uL    Basophils Absolute 0.03 0.00 - 0.10 x10*3/uL   Comprehensive metabolic panel   Result Value Ref Range    Glucose 162 (H) 65 - 99 mg/dL    Sodium 135 133 - 145 mmol/L    Potassium 3.6 3.4 - 5.1 mmol/L    Chloride 99 97 - 107 mmol/L    Bicarbonate 24 24 - 31 mmol/L    Urea Nitrogen 39 (H) 8 - 25 mg/dL    Creatinine 1.10 0.40 - 1.60 mg/dL    eGFR 52 (L) >60 mL/min/1.73m*2    Calcium 9.1 8.5 - 10.4 mg/dL    Albumin 3.4 (L) 3.5 - 5.0 g/dL    Alkaline Phosphatase 100 35 - 125 U/L    Total Protein 5.9 5.9 - 7.9 g/dL     (H) 5 - 40 U/L    Bilirubin, Total 0.4 0.1 - 1.2 mg/dL     (H) 5 - 40 U/L    Anion Gap 12 <=19 mmol/L   Troponin T, High Sensitivity   Result Value Ref Range    Troponin T, High Sensitivity 8,151 (HH) <=14 ng/L   Protime-INR   Result Value Ref Range    Protime 11.9 9.3 - 12.7 seconds    INR 1.1 0.9 - 1.2   APTT   Result Value Ref Range    aPTT 24.5 22.0 - 32.5 seconds   ECG 12 lead   Result Value Ref Range    Ventricular Rate 109 BPM    Atrial Rate 81 BPM    QRS Duration 124 ms    QT Interval 376 ms    QTC Calculation(Bazett) 506 ms    R Axis 86 degrees    T Axis -89 degrees    QRS Count 18 beats    Q Onset 205 ms    T Offset 393 ms    QTC Fredericia 458 ms   ECG 12 lead   Result Value Ref Range    Ventricular Rate 81 BPM    QRS Duration 134 ms    QT Interval 442 ms    QTC Calculation(Bazett) 513 ms    R Axis 89 degrees     T Axis -88 degrees    QRS Count 13 beats    Q Onset 204 ms    T Offset 425 ms    QTC Fredericia 488 ms   POCT GLUCOSE   Result Value Ref Range    POCT Glucose 135 (H) 74 - 99 mg/dL   CBC and Auto Differential   Result Value Ref Range    WBC 11.4 (H) 4.4 - 11.3 x10*3/uL    nRBC 0.0 0.0 - 0.0 /100 WBCs    RBC 2.99 (L) 4.00 - 5.20 x10*6/uL    Hemoglobin 9.5 (L) 12.0 - 16.0 g/dL    Hematocrit 29.6 (L) 36.0 - 46.0 %    MCV 99 80 - 100 fL    MCH 31.8 26.0 - 34.0 pg    MCHC 32.1 32.0 - 36.0 g/dL    RDW 15.7 (H) 11.5 - 14.5 %    Platelets 159 150 - 450 x10*3/uL    Neutrophils % 89.5 40.0 - 80.0 %    Immature Granulocytes %, Automated 1.1 (H) 0.0 - 0.9 %    Lymphocytes % 3.2 13.0 - 44.0 %    Monocytes % 5.9 2.0 - 10.0 %    Eosinophils % 0.1 0.0 - 6.0 %    Basophils % 0.2 0.0 - 2.0 %    Neutrophils Absolute 10.20 (H) 1.60 - 5.50 x10*3/uL    Immature Granulocytes Absolute, Automated 0.12 0.00 - 0.50 x10*3/uL    Lymphocytes Absolute 0.36 (L) 0.80 - 3.00 x10*3/uL    Monocytes Absolute 0.67 0.05 - 0.80 x10*3/uL    Eosinophils Absolute 0.01 0.00 - 0.40 x10*3/uL    Basophils Absolute 0.02 0.00 - 0.10 x10*3/uL   Comprehensive Metabolic Panel   Result Value Ref Range    Glucose 131 (H) 65 - 99 mg/dL    Sodium 141 133 - 145 mmol/L    Potassium 3.8 3.4 - 5.1 mmol/L    Chloride 104 97 - 107 mmol/L    Bicarbonate 22 (L) 24 - 31 mmol/L    Urea Nitrogen 36 (H) 8 - 25 mg/dL    Creatinine 0.90 0.40 - 1.60 mg/dL    eGFR 66 >60 mL/min/1.73m*2    Calcium 8.9 8.5 - 10.4 mg/dL    Albumin 3.3 (L) 3.5 - 5.0 g/dL    Alkaline Phosphatase 94 35 - 125 U/L    Total Protein 5.7 (L) 5.9 - 7.9 g/dL     (H) 5 - 40 U/L    Bilirubin, Total 0.5 0.1 - 1.2 mg/dL     (H) 5 - 40 U/L    Anion Gap 15 <=19 mmol/L   Magnesium   Result Value Ref Range    Magnesium 2.30 1.60 - 3.10 mg/dL   Phosphorus   Result Value Ref Range    Phosphorus 3.7 2.5 - 4.5 mg/dL   Lipid Panel   Result Value Ref Range    Cholesterol 111 (L) 133 - 200 mg/dL    HDL-Cholesterol  38.0 (L) >50.0 mg/dL    Cholesterol/HDL Ratio 2.9 SEE COMMENT    LDL Calculated 51 (L) 65 - 130 mg/dL    Triglycerides 109 40 - 150 mg/dL   Hemoglobin A1C   Result Value Ref Range    Hemoglobin A1C 5.1 See below %    Estimated Average Glucose 100 Not Established mg/dL   Transthoracic Echo (TTE) Complete   Result Value Ref Range    AV pk iman 1.27 m/s    LVOT diam 1.90 cm    AV mn grad 3.5 mmHg    MV E/A ratio 41.95     LV Biplane EF 45 %    RVSP 30.7 mmHg    LVIDd 4.02 cm    AV pk grad 6.4 mmHg    Aortic Valve Area by Continuity of VTI 1.57 cm2    Aortic Valve Area by Continuity of Peak Velocity 1.39 cm2    LV A4C EF 54.1    POCT GLUCOSE   Result Value Ref Range    POCT Glucose 122 (H) 74 - 99 mg/dL   POCT GLUCOSE   Result Value Ref Range    POCT Glucose 100 (H) 74 - 99 mg/dL   POCT GLUCOSE   Result Value Ref Range    POCT Glucose 108 (H) 74 - 99 mg/dL   POCT GLUCOSE   Result Value Ref Range    POCT Glucose 124 (H) 74 - 99 mg/dL   POCT GLUCOSE   Result Value Ref Range    POCT Glucose 113 (H) 74 - 99 mg/dL   CBC and Auto Differential   Result Value Ref Range    WBC 12.7 (H) 4.4 - 11.3 x10*3/uL    nRBC 0.3 (H) 0.0 - 0.0 /100 WBCs    RBC 3.13 (L) 4.00 - 5.20 x10*6/uL    Hemoglobin 10.1 (L) 12.0 - 16.0 g/dL    Hematocrit 33.3 (L) 36.0 - 46.0 %     (H) 80 - 100 fL    MCH 32.3 26.0 - 34.0 pg    MCHC 30.3 (L) 32.0 - 36.0 g/dL    RDW 15.8 (H) 11.5 - 14.5 %    Platelets 168 150 - 450 x10*3/uL    Neutrophils % 87.6 40.0 - 80.0 %    Immature Granulocytes %, Automated 0.9 0.0 - 0.9 %    Lymphocytes % 3.7 13.0 - 44.0 %    Monocytes % 7.4 2.0 - 10.0 %    Eosinophils % 0.1 0.0 - 6.0 %    Basophils % 0.3 0.0 - 2.0 %    Neutrophils Absolute 11.08 (H) 1.60 - 5.50 x10*3/uL    Immature Granulocytes Absolute, Automated 0.12 0.00 - 0.50 x10*3/uL    Lymphocytes Absolute 0.47 (L) 0.80 - 3.00 x10*3/uL    Monocytes Absolute 0.93 (H) 0.05 - 0.80 x10*3/uL    Eosinophils Absolute 0.01 0.00 - 0.40 x10*3/uL    Basophils Absolute 0.04 0.00 -  0.10 x10*3/uL   Comprehensive Metabolic Panel   Result Value Ref Range    Glucose 125 (H) 65 - 99 mg/dL    Sodium 142 133 - 145 mmol/L    Potassium 4.7 3.4 - 5.1 mmol/L    Chloride 109 (H) 97 - 107 mmol/L    Bicarbonate 20 (L) 24 - 31 mmol/L    Urea Nitrogen 40 (H) 8 - 25 mg/dL    Creatinine 0.80 0.40 - 1.60 mg/dL    eGFR 76 >60 mL/min/1.73m*2    Calcium 9.5 8.5 - 10.4 mg/dL    Albumin 3.2 (L) 3.5 - 5.0 g/dL    Alkaline Phosphatase 114 35 - 125 U/L    Total Protein 5.9 5.9 - 7.9 g/dL     (H) 5 - 40 U/L    Bilirubin, Total 0.6 0.1 - 1.2 mg/dL     (H) 5 - 40 U/L    Anion Gap 13 <=19 mmol/L   Magnesium   Result Value Ref Range    Magnesium 2.60 1.60 - 3.10 mg/dL   Phosphorus   Result Value Ref Range    Phosphorus 4.7 (H) 2.5 - 4.5 mg/dL   POCT GLUCOSE   Result Value Ref Range    POCT Glucose 112 (H) 74 - 99 mg/dL   POCT GLUCOSE   Result Value Ref Range    POCT Glucose 124 (H) 74 - 99 mg/dL   POCT GLUCOSE   Result Value Ref Range    POCT Glucose 109 (H) 74 - 99 mg/dL   POCT GLUCOSE   Result Value Ref Range    POCT Glucose 117 (H) 74 - 99 mg/dL   POCT GLUCOSE   Result Value Ref Range    POCT Glucose 116 (H) 74 - 99 mg/dL   Magnesium   Result Value Ref Range    Magnesium 2.70 1.60 - 3.10 mg/dL   Uric Acid   Result Value Ref Range    Uric Acid 11.1 (H) 2.5 - 6.8 mg/dL   Phosphorus   Result Value Ref Range    Phosphorus 4.9 (H) 2.5 - 4.5 mg/dL   CBC and Auto Differential   Result Value Ref Range    WBC 14.9 (H) 4.4 - 11.3 x10*3/uL    nRBC 1.5 (H) 0.0 - 0.0 /100 WBCs    RBC 3.24 (L) 4.00 - 5.20 x10*6/uL    Hemoglobin 10.4 (L) 12.0 - 16.0 g/dL    Hematocrit 32.6 (L) 36.0 - 46.0 %     (H) 80 - 100 fL    MCH 32.1 26.0 - 34.0 pg    MCHC 31.9 (L) 32.0 - 36.0 g/dL    RDW 15.9 (H) 11.5 - 14.5 %    Platelets 181 150 - 450 x10*3/uL    Neutrophils % 89.2 40.0 - 80.0 %    Immature Granulocytes %, Automated 1.7 (H) 0.0 - 0.9 %    Lymphocytes % 2.7 13.0 - 44.0 %    Monocytes % 6.1 2.0 - 10.0 %    Eosinophils % 0.0  0.0 - 6.0 %    Basophils % 0.3 0.0 - 2.0 %    Neutrophils Absolute 13.32 (H) 1.60 - 5.50 x10*3/uL    Immature Granulocytes Absolute, Automated 0.25 0.00 - 0.50 x10*3/uL    Lymphocytes Absolute 0.41 (L) 0.80 - 3.00 x10*3/uL    Monocytes Absolute 0.91 (H) 0.05 - 0.80 x10*3/uL    Eosinophils Absolute 0.00 0.00 - 0.40 x10*3/uL    Basophils Absolute 0.05 0.00 - 0.10 x10*3/uL   Basic Metabolic Panel   Result Value Ref Range    Glucose 116 (H) 65 - 99 mg/dL    Sodium 149 (H) 133 - 145 mmol/L    Potassium 4.7 3.4 - 5.1 mmol/L    Chloride 110 (H) 97 - 107 mmol/L    Bicarbonate 22 (L) 24 - 31 mmol/L    Urea Nitrogen 56 (H) 8 - 25 mg/dL    Creatinine 0.90 0.40 - 1.60 mg/dL    eGFR 66 >60 mL/min/1.73m*2    Calcium 9.7 8.5 - 10.4 mg/dL    Anion Gap 17 <=19 mmol/L   Vitamin D 25-Hydroxy,Total (for eval of Vitamin D levels)   Result Value Ref Range    Vitamin D, 25-Hydroxy, Total 54 31 - 100 ng/mL   POCT GLUCOSE   Result Value Ref Range    POCT Glucose 121 (H) 74 - 99 mg/dL   POCT GLUCOSE   Result Value Ref Range    POCT Glucose 123 (H) 74 - 99 mg/dL   POCT GLUCOSE   Result Value Ref Range    POCT Glucose 126 (H) 74 - 99 mg/dL   POCT GLUCOSE   Result Value Ref Range    POCT Glucose 119 (H) 74 - 99 mg/dL   Electrocardiogram, 12-lead PRN ACS symptoms   Result Value Ref Range    Ventricular Rate 210 BPM    Atrial Rate 208 BPM    QRS Duration 174 ms    QT Interval 286 ms    QTC Calculation(Bazett) 534 ms    R Axis 90 degrees    T Axis 220 degrees    QRS Count 35 beats    Q Onset 171 ms    T Offset 314 ms    QTC Fredericia 434 ms    Electrocardiogram, 12-lead PRN ACS symptoms    Result Date: 6/6/2024  Wide QRS tachycardia Rightward axis Left bundle branch block Abnormal ECG When compared with ECG of 04-JUN-2024 01:55, Wide QRS tachycardia has replaced Wide QRS rhythm Vent. rate has increased  BPM    ECG 12 lead    Result Date: 6/4/2024  Wide QRS rhythm Nonspecific intraventricular block Cannot rule out Anteroseptal infarct , age  undetermined T wave abnormality, consider inferolateral ischemia Abnormal ECG When compared with ECG of 03-JUN-2024 23:43, (unconfirmed) Previous ECG has undetermined rhythm, needs review Confirmed by Kin Miller ([id number]) on 6/4/2024 3:15:35 PM    ECG 12 lead    Result Date: 6/4/2024   Poor data quality, interpretation may be adversely affected Undetermined rhythm Anteroseptal infarct , age undetermined Marked ST abnormality, possible inferior subendocardial injury Abnormal ECG When compared with ECG of 27-DEC-1996 15:11, Significant changes have occurred Confirmed by Kin Miller ([id number]) on 6/4/2024 3:14:43 PM    XR chest 1 view    Result Date: 6/4/2024  Interpreted By:  Augusta Stubbs, STUDY: XR CHEST 1 VIEW;  6/4/2024 12:03 pm   INDICATION: Signs/Symptoms:ng placement.   COMPARISON: 06/04/2024 at 1:00 a.m.   ACCESSION NUMBER(S): [id number]   ORDERING CLINICIAN: TREVOR MANCIA   TECHNIQUE: The   FINDINGS: The cardiac silhouette appears prominent.   There is dense consolidation in the right midlung. There may also be right pleural fluid.   A nasogastric tube terminates below the diaphragm. A venous catheter on the left terminates in the region of the superior vena cava.   There are degenerative changes of the spine.   COMPARISON OF FINDING: The nasogastric tube was placed in the interval.       Interval nasogastric tube placement. Dense consolidation on the right.   MACRO: none   Signed by: Augusta Stubbs 6/4/2024 1:11 PM Dictation workstation:   [id number]    Transthoracic Echo (TTE) Complete    Result Date: 6/4/2024           [place] [address]            Phone [phone] TRANSTHORACIC ECHOCARDIOGRAM REPORT  Patient Name:     KVNG MARSH         Reading Physician:   [id number] Dane Harding DO Study Date:       6/4/2024             Ordering Provider:   [id number] LUIS PIKE MRN/PID:          [id number]              Fellow: Accession#:       [id number]         Nurse: Date of           1950 / 74 years Sonographer:         Grayson Hooper [place] Birth/Age: Gender:           F                    Additional Staff: Height:           166.00 cm            Admit Date: Weight:           78.00 kg             Admission Status:    Inpatient - Routine BSA / BMI:        1.86 m2 / 28.31      Department Location: [facility]                   kg/m2 Blood Pressure: 135 /76 mmHg Study Type:    TRANSTHORACIC ECHO (TTE) COMPLETE Diagnosis/ICD: ST elevation (STEMI) myocardial infarction involving other                sites-I21.29 Indication:    STEMI CPT Codes:     Echo Complete w Full [id number] Patient History: Pertinent History: COPD, CHF, CAD, HTN and Hyperlipidemia. Lung ca,STEMI. Study Detail: The following Echo studies were performed: 2D, M-Mode, Doppler and               color flow. Technically challenging study due to poor acoustic               windows and patient lying in supine position.  PHYSICIAN INTERPRETATION: Left Ventricle: Left ventricular systolic function is normal, with an estimated ejection fraction of 55-60%. There are no regional wall motion abnormalities. The left ventricular cavity size is normal. Left ventricular diastolic filling was indeterminate. Left Atrium: The left atrium is normal in size. Right Ventricle: The right ventricle is normal in size. There is normal right ventricular global systolic function. Right Atrium: The right atrium was not well visualized. Aortic Valve: The aortic valve is trileaflet. There is no evidence of aortic valve regurgitation. The peak instantaneous gradient of the aortic valve is 6.4 mmHg. The mean gradient of the aortic valve is 3.5 mmHg. Mitral Valve: The mitral valve is normal in structure. There is no evidence of mitral valve regurgitation. Tricuspid Valve: The tricuspid valve was not well visualized. Tricuspid regurgitation was not assessed. Pulmonic Valve: The pulmonic  valve is not well visualized. The pulmonic valve regurgitation was not well visualized. Pericardium: There is no pericardial effusion noted. Aorta: The aortic root was not well visualized.  CONCLUSIONS:  1. Left ventricular systolic function is normal with a 55-60% estimated ejection fraction.  2. Poorly visualized anatomical structures due to suboptimal image quality. QUANTITATIVE DATA SUMMARY: 2D MEASUREMENTS:                          Normal Ranges: IVSd:          1.08 cm   (0.6-1.1cm) LVPWd:         1.00 cm   (0.6-1.1cm) LVIDd:         4.02 cm   (3.9-5.9cm) LVIDs:         3.44 cm LV Mass Index: 72.9 g/m2 LV % FS        14.6 % LA VOLUME:                     Normal Ranges: LA Vol A4C: 44.2 ml RA VOLUME BY A/L METHOD:                       Normal Ranges: RA Area A4C: 17.3 cm2 LV SYSTOLIC FUNCTION BY 2D PLANIMETRY (MOD):                     Normal Ranges: EF-A4C View: 54.1 % (>=55%) EF-A2C View: 42.6 % EF-Biplane:  44.5 % LV DIASTOLIC FUNCTION:                     Normal Ranges: MV Peak E: 0.86 m/s (0.7-1.2 m/s) MV Peak A: 0.02 m/s (0.42-0.7 m/s) E/A Ratio: 41.95    (1.0-2.2) MITRAL VALVE:                 Normal Ranges: MV DT: 140 msec (150-240msec) AORTIC VALVE:                                   Normal Ranges: AoV Vmax:                1.27 m/s (<=1.7m/s) AoV Peak P.4 mmHg (<20mmHg) AoV Mean PG:             3.5 mmHg (1.7-11.5mmHg) LVOT Max J[place]:            0.64 m/s (<=1.1m/s) AoV VTI:                 23.27 cm (18-25cm) LVOT VTI:                13.24 cm LVOT Diameter:           1.90 cm  (1.8-2.4cm) AoV Area, VTI:           1.57 cm2 (2.5-5.5cm2) AoV Area,Vmax:           1.39 cm2 (2.5-4.5cm2) AoV Dimensionless Index: 0.57  RIGHT VENTRICLE: RV Basal 3.80 cm RV Mid   3.30 cm RV Major 5.7 cm TRICUSPID VALVE/RVSP:                             Normal Ranges: Peak TR Velocity: 1.98 m/s RV Syst Pressure: 30.7 mmHg (< 30mmHg) IVC Diam:         2.40 cm  [id number] Dane Harding DO Electronically signed on 2024 at  1:10:18 PM  ** Final **     XR chest 1 view    Result Date: 6/4/2024  Interpreted By:  Luis Merida, STUDY: XR CHEST 1 VIEW;  6/4/2024 1:01 am   INDICATION: Signs/Symptoms:co.   COMPARISON: 12/25/2022   ACCESSION NUMBER(S): [id number]   ORDERING CLINICIAN: KP ZAMARRIPA   FINDINGS: There is a focal masslike consolidation infiltrate in the right midlung measuring 4.2 cm x 5.5 cm. This demonstrates central lucency. This is surrounded by more ill-defined airspace disease. The left lung is clear. There is a left chest port terminating over the right atrium. The heart is enlarged. Aorta is ectatic. No pneumothorax.       Focal masslike opacity projecting of the right midlung measuring 5.5 x 4.4 cm with a central lucency consistent with known lung cancer.     MACRO: None.   Signed by: Luis Merida 6/4/2024 1:23 AM Dictation workstation:   [id number]    CT brain attack angio head and neck W and WO IV contrast    Result Date: 6/4/2024  Interpreted By:  Luis Merida, STUDY: CT BRAIN ATTACK ANGIO HEAD AND NECK W AND WO IV CONTRAST;  6/4/2024 12:33 am   INDICATION: Signs/Symptoms:droop, ams   ACCESSION NUMBER(S): [id number]   ORDERING CLINICIAN: KP ZAMARRIPA   TECHNIQUE: Following IV contrast administration of 75 ML iodinated contrast, a CT angiography of the head and neck was performed. MIPS and 3D reconstructions of the [place] and neck were created on an independent workstation and reviewed.   FINDINGS: Emphysema at the lung apices. Moderate size right pleural effusion.     CTA NECK:       LEFT VERTEBRAL ARTERY: Mild atherosclerosis at the origin. No hemodynamically significant stenosis, occlusion, or dissection.   LEFT COMMON/INTERNAL CAROTID ARTERY: There is at least mild stenosis of the left common carotid artery origin, evaluation limited due to motion artifact. The remainder of the left common carotid artery is widely patent. Just distal to the left carotid bulb there is complete occlusion of the  entire extracranial left ICA extending to the intracranial the ICA.   RIGHT VERTEBRAL ARTERY: Non dominant. No hemodynamically significant stenosis, occlusion, or dissection.   RIGHT COMMON/INTERNAL CAROTID ARTERY: Atherosclerosis at the bulb and proximal postbulbar ICA. No hemodynamically significant stenosis, occlusion, or dissection.     The neck soft tissues show no evidence of mass, fluid collection, or enlarged lymph nodes. There is no acute osseous abnormality.       CTA HEAD:   ANTERIOR CIRCULATION:   - Internal Carotid Arteries: Complete occlusion of the LEFT petrous, cavernous, clinoid, and ophthalmic ICA segments. Reconstitution of flow and normal caliber at the left communicating segment. 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 Intracranial right ICA widely patent with minimal calcific atherosclerosis.   - Middle Cerebral Arteries: Aneurysm clip at the left MCA bifurcation. The left M1 segment is patent. There is an occluded M2 left MCA branch as well as numerous left M3 branches in both superior and inferior divisions, both demonstrating a paucity of opacification when compared to the right side throughout the M 4 segments. On the right, no hemodynamically significant stenosis or occlusion.   - Anterior Cerebral Arteries:  No hemodynamically significant stenosis or occlusion.     POSTERIOR CIRCULATION:   - Intracranial Vertebral Arteries:  No hemodynamically significant stenosis or occlusion.   - Basilar Artery:  No hemodynamically significant stenosis or occlusion.   - Posterior Cerebral Arteries:  No hemodynamically significant stenosis or occlusion.   No arteriovenous malformation is visualized. No pathologic intracranial enhancement or discrete mass. The dural venous sinuses are patent.   MIPS and 3D reconstructions confirm the above findings.       1. Left M1 segment is patent. There is an occluded M2 left MCA branch as well as numerous left M3 branches in both superior and inferior divisions,  both demonstrating a paucity of opacification when compared to the right side throughout the M4 segments, particularly in the inferior division.   2. Complete occlusion of the left ICA from the bulb to the ophthalmic segment with reconstitution and normalization of caliber at the communicating segment. This is age indeterminate.   3. No evidence of intracranial aneurysm. 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   MACRO: Luis Merida discussed the significance and urgency of this critical finding by telephone with  KP ZAMARRIPA on 6/4/2024 at 12:25 a.m.. (**-RCF-**) Findings:  See findings.   Signed by: Luis Merida 6/4/2024 1:22 AM Dictation workstation:   [id number]    CT brain attack head wo IV contrast    Result Date: 6/4/2024  Interpreted By:  Luis Merida, STUDY: CT BRAIN ATTACK HEAD WO IV CONTRAST;  6/4/2024 12:05 am   INDICATION: Signs/Symptoms:Stroke Evaluation.   COMPARISON: None.   ACCESSION NUMBER(S): [id number]   ORDERING CLINICIAN: KP ZAMARRIPA   TECHNIQUE: Noncontrast axial CT images of head were obtained with coronal and sagittal reconstructed images.   FINDINGS: BRAIN PARENCHYMA: Evaluation is limited due to extensive motion artifact and beam hardening artifact from postsurgical changes in the left middle cranial fossa and suprasellar cistern. There is subtle hypoattenuation in the left frontal lobe involving the cortical gray white junction raising concern for early ischemic change. There is chronic gliotic changes within the left basal ganglia, left subinsular white matter, and subcortical white matter of left parietal lobe. There is no acute intracranial hemorrhage or mass effect.   VENTRICLES and EXTRA-AXIAL SPACES: There is a hyperdense artery sign in the left sylvian fissure representing an M2/M3 vessel. Aneurysm clips adjacent to the anterior left clinoid process and in the left sylvian fissure are noted. No evidence of acute extra-axial  hemorrhage. The ventricles are normal in caliber.   PARANASAL SINUSES/MASTOIDS:  No hemorrhage or air-fluid levels within the visualized paranasal sinuses. The mastoids are well aerated.   CALVARIUM/ORBITS: Left frontotemporal craniotomy. No skull fracture. The orbits and globes are intact to the extent visualized.   EXTRACRANIAL SOFT TISSUES: No discernible abnormality.       1. Hyperdense left M2/M3 vessel suspicious for an acute intraluminal thrombus. Concern for early loss of gray-white differentiation involving the left frontal lobe reflecting acute ischemic infarct.   2. No acute intracranial hemorrhage.   3. 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   4. Gliotic changes in the subinsular white matter and left basal ganglia.   MACRO: Luis Merida discussed the significance and urgency of this critical finding by CALE VALLES with  KP ZAMARRIPA on 6/4/2024 at 12:29 am with confirmation of receipt .  (**-RCF-**) Findings:  See findings.   Signed by: Luis Merida 6/4/2024 12:32 AM Dictation workstation:   [id number]       Assessment/Plan   IMP:    1.  Pulse present V. Tach  2.  Non-small cell lung cancer  3.  Stroke  4.  STEMI  5.  Palliative care    DNRCC  Not capable  Spouse is legal surrogate    74-year-old female with underlying Progressive non-small cell lung cancer presenting with acute STEMI and stroke.  Decision was already made to pursue more of a comfort model of care this morning per attending service, patient now in a unsustainable cardiac rhythm with uncontrolled symptoms.  Discussed with attending service and cardiology, attending service has already put a DNRCC in place and comfort meds.  I did call to confirm that family was coming to the bedside, family requested last rites and I reached out to father em. Patient remains uncomfortable despite iv push morphine and ativan. Started morphine gtt. Spoke to family at bedside.     I spent 90 minutes in  the professional and overall care of this patient. 16 min was spent in ACP discussion with spouse, children cardiology and attending service.       REGGIE Bonner-CNP

## 2024-06-06 NOTE — PROGRESS NOTES
Pulmonary Progress Note 06/06/24     FOLLOWUP FOR: Acute pulmonary insufficiency, lung mass    SUBJECTIVE  Remains unchanged.  Nonverbal but has not had any need for escalation from a pulmonary perspective    PHYSICAL EXAM        6/5/2024    12:00 PM 6/5/2024     3:45 PM 6/5/2024     7:36 PM 6/6/2024    12:41 AM 6/6/2024     3:42 AM 6/6/2024     6:02 AM 6/6/2024     7:47 AM   Vitals   Systolic 141 144 145 152 130  126   Diastolic 76 88 67 92 72  77   Heart Rate 90 101 99 98 80  70   Temp 36 °C (96.8 °F) 36.1 °C (97 °F) 36 °C (96.8 °F) 36.1 °C (97 °F) 35.7 °C (96.3 °F)  36.1 °C (97 °F)   Resp 24 24 20 20 16  14   Weight (lb)      172.4    BMI      27.83 kg/m2    BSA (m2)      1.91 m2        Intake/Output last 3 shifts:  I/O last 3 completed shifts:  In: 100 (1.3 mL/kg) [IV Piggyback:100]  Out: 720 (9.2 mL/kg) [Urine:720 (0.3 mL/kg/hr)]  Weight: 78.2 kg   Intake/Output this shift:  No intake/output data recorded.        Physical Exam  Vitals reviewed.   Constitutional:       General: She is not in acute distress.  Cardiovascular:      Rate and Rhythm: Normal rate and regular rhythm.   Pulmonary:      Effort: Pulmonary effort is normal.      Breath sounds: Normal breath sounds. No wheezing.   Abdominal:      Palpations: Abdomen is soft.   Musculoskeletal:      Right lower leg: No edema.      Left lower leg: No edema.   Neurological:      Comments: Nonverbal, unchanged compared with yesterday           Scheduled medications  aspirin, 81 mg, nasogastric tube, Daily  aspirin, 150 mg, rectal, Daily  digoxin, 125 mcg, intravenous, Daily  enoxaparin, 40 mg, subcutaneous, q24h  insulin lispro, 0-5 Units, subcutaneous, q4h  levETIRAcetam, 500 mg, intravenous, q12h  perflutren lipid microspheres, 0.5-10 mL of dilution, intravenous, Once in imaging  perflutren protein A microsphere, 0.5 mL, intravenous, Once in imaging  sulfur hexafluoride microsphr, 2 mL, intravenous, Once in imaging      Continuous medications      PRN medications  PRN medications: dextrose, dextrose, glucagon, glucagon, [] hydrALAZINE **FOLLOWED BY** hydrALAZINE, nitroglycerin, polyethylene glycol     Labs:  Lab Results   Component Value Date     (H) 2024    K 4.7 2024     (H) 2024    CO2 22 (L) 2024    BUN 56 (H) 2024    CREATININE 0.90 2024    GLUCOSE 116 (H) 2024    CALCIUM 9.7 2024     Lab Results   Component Value Date    WBC 14.9 (H) 2024    HGB 10.4 (L) 2024    HCT 32.6 (L) 2024     (H) 2024     2024       Imaging:  ECG 12 lead    Result Date: 2024  Wide QRS rhythm Nonspecific intraventricular block Cannot rule out Anteroseptal infarct , age undetermined T wave abnormality, consider inferolateral ischemia Abnormal ECG When compared with ECG of 2024 23:43, (unconfirmed) Previous ECG has undetermined rhythm, needs review Confirmed by Kin Miller ([id number]) on 2024 3:15:35 PM      XR chest 1 view    Result Date: 2024  Interpreted By:  Augusta Stubbs, STUDY: XR CHEST 1 VIEW;  2024 12:03 pm   INDICATION: Signs/Symptoms:ng placement.   COMPARISON: 2024 at 1:00 a.m.   ACCESSION NUMBER(S): [id number]   ORDERING CLINICIAN: TREVOR MANCIA   TECHNIQUE: The   FINDINGS: The cardiac silhouette appears prominent.   There is dense consolidation in the right midlung. There may also be right pleural fluid.   A nasogastric tube terminates below the diaphragm. A venous catheter on the left terminates in the region of the superior vena cava.   There are degenerative changes of the spine.   COMPARISON OF FINDING: The nasogastric tube was placed in the interval.       Interval nasogastric tube placement. Dense consolidation on the right.   MACRO: none   Signed by: Augusta Stubbs 2024 1:11 PM Dictation workstation:   [id number]    Transthoracic Echo (TTE) Complete    Result Date: 2024           [facility] [address]  [place] [id number]            Phone [phone] TRANSTHORACIC ECHOCARDIOGRAM REPORT  Patient Name:     KVNG MARSH         Reading Physician:   [id number] Dane Harding DO Study Date:       6/4/2024             Ordering Provider:   [id number] LUIS PIKE MRN/PID:          [id number]             Fellow: Accession#:       [id number]         Nurse: Date of           1950 / 74 years Sonographer:         Grayson Hooper RDCS Birth/Age: Gender:           F                    Additional Staff: Height:           166.00 cm            Admit Date: Weight:           78.00 kg             Admission Status:    Inpatient - Routine BSA / BMI:        1.86 m2 / 28.31      Department Location: [facility]                   kg/m2 Blood Pressure: 135 /76 mmHg Study Type:    TRANSTHORACIC ECHO (TTE) COMPLETE Diagnosis/ICD: ST elevation (STEMI) myocardial infarction involving other                sites-I21.29 Indication:    STEMI CPT Codes:     Echo Complete w Full [id number] Patient History: Pertinent History: COPD, CHF, CAD, HTN and Hyperlipidemia. Lung ca,STEMI. Study Detail: The following Echo studies were performed: 2D, M-Mode, Doppler and               color flow. Technically challenging study due to poor acoustic               windows and patient lying in supine position.  PHYSICIAN INTERPRETATION: Left Ventricle: Left ventricular systolic function is normal, with an estimated ejection fraction of 55-60%. There are no regional wall motion abnormalities. The left ventricular cavity size is normal. Left ventricular diastolic filling was indeterminate. Left Atrium: The left atrium is normal in size. Right Ventricle: The right ventricle is normal in size. There is normal right ventricular global systolic function. Right Atrium: The right atrium was not well visualized. Aortic Valve: The aortic valve is trileaflet. There is no evidence of aortic valve regurgitation.  The peak instantaneous gradient of the aortic valve is 6.4 mmHg. The mean gradient of the aortic valve is 3.5 mmHg. Mitral Valve: The mitral valve is normal in structure. There is no evidence of mitral valve regurgitation. Tricuspid Valve: The tricuspid valve was not well visualized. Tricuspid regurgitation was not assessed. Pulmonic Valve: The pulmonic valve is not well visualized. The pulmonic valve regurgitation was not well visualized. Pericardium: There is no pericardial effusion noted. Aorta: The aortic root was not well visualized.  CONCLUSIONS:  1. Left ventricular systolic function is normal with a 55-60% estimated ejection fraction.  2. Poorly visualized anatomical structures due to suboptimal image quality. QUANTITATIVE DATA SUMMARY: 2D MEASUREMENTS:                          Normal Ranges: IVSd:          1.08 cm   (0.6-1.1cm) LVPWd:         1.00 cm   (0.6-1.1cm) LVIDd:         4.02 cm   (3.9-5.9cm) LVIDs:         3.44 cm LV Mass Index: 72.9 g/m2 LV % FS        14.6 % LA VOLUME:                     Normal Ranges: LA Vol A4C: 44.2 ml RA VOLUME BY A/L METHOD:                       Normal Ranges: RA Area A4C: 17.3 cm2 LV SYSTOLIC FUNCTION BY 2D PLANIMETRY (MOD):                     Normal Ranges: EF-A4C View: 54.1 % (>=55%) EF-A2C View: 42.6 % EF-Biplane:  44.5 % LV DIASTOLIC FUNCTION:                     Normal Ranges: MV Peak E: 0.86 m/s (0.7-1.2 m/s) MV Peak A: 0.02 m/s (0.42-0.7 m/s) E/A Ratio: 41.95    (1.0-2.2) MITRAL VALVE:                 Normal Ranges: MV DT: 140 msec (150-240msec) AORTIC VALVE:                                   Normal Ranges: AoV Vmax:                1.27 m/s (<=1.7m/s) AoV Peak P.4 mmHg (<20mmHg) AoV Mean PG:             3.5 mmHg (1.7-11.5mmHg) LVOT Max Josesito:            0.64 m/s (<=1.1m/s) AoV VTI:                 23.27 cm (18-25cm) LVOT VTI:                13.24 cm LVOT Diameter:           1.90 cm  (1.8-2.4cm) AoV Area, VTI:           1.57 cm2 (2.5-5.5cm2) AoV  Area,Vmax:           1.39 cm2 (2.5-4.5cm2) AoV Dimensionless Index: 0.57  RIGHT VENTRICLE: RV Basal 3.80 cm RV Mid   3.30 cm RV Major 5.7 cm TRICUSPID VALVE/RVSP:                             Normal Ranges: Peak TR Velocity: 1.98 m/s RV Syst Pressure: 30.7 mmHg (< 30mmHg) IVC Diam:         2.40 cm  [id number] Dane Harding DO Electronically signed on 6/4/2024 at 1:10:18 PM  ** Final **     XR chest 1 view    Result Date: 6/4/2024  Interpreted By:  Luis Merida, STUDY: XR CHEST 1 VIEW;  6/4/2024 1:01 am   INDICATION: Signs/Symptoms:co.   COMPARISON: 12/25/2022   ACCESSION NUMBER(S): [id number]   ORDERING CLINICIAN: PK ZAMARRIPA   FINDINGS: There is a focal masslike consolidation infiltrate in the right midlung measuring 4.2 cm x 5.5 cm. This demonstrates central lucency. This is surrounded by more ill-defined airspace disease. The left lung is clear. There is a left chest port terminating over the right atrium. The heart is enlarged. Aorta is ectatic. No pneumothorax.       Focal masslike opacity projecting of the right midlung measuring 5.5 x 4.4 cm with a central lucency consistent with known lung cancer.     MACRO: None.   Signed by: Luis Merida 6/4/2024 1:23 AM Dictation workstation:   [id number]    CT brain attack angio head and neck W and WO IV contrast    Result Date: 6/4/2024  Interpreted By:  Luis Merida, STUDY: CT BRAIN ATTACK ANGIO HEAD AND NECK W AND WO IV CONTRAST;  6/4/2024 12:33 am   INDICATION: Signs/Symptoms:droop, ams   ACCESSION NUMBER(S): [id number]   ORDERING CLINICIAN: KP ZAMARRIPA   TECHNIQUE: Following IV contrast administration of 75 ML iodinated contrast, a CT angiography of the head and neck was performed. MIPS and 3D reconstructions of the [place] and neck were created on an independent workstation and reviewed.   FINDINGS: Emphysema at the lung apices. Moderate size right pleural effusion.     CTA NECK:       LEFT VERTEBRAL ARTERY: Mild atherosclerosis at the origin. No  hemodynamically significant stenosis, occlusion, or dissection.   LEFT COMMON/INTERNAL CAROTID ARTERY: There is at least mild stenosis of the left common carotid artery origin, evaluation limited due to motion artifact. The remainder of the left common carotid artery is widely patent. Just distal to the left carotid bulb there is complete occlusion of the entire extracranial left ICA extending to the intracranial the ICA.   RIGHT VERTEBRAL ARTERY: Non dominant. No hemodynamically significant stenosis, occlusion, or dissection.   RIGHT COMMON/INTERNAL CAROTID ARTERY: Atherosclerosis at the bulb and proximal postbulbar ICA. No hemodynamically significant stenosis, occlusion, or dissection.     The neck soft tissues show no evidence of mass, fluid collection, or enlarged lymph nodes. There is no acute osseous abnormality.       CTA HEAD:   ANTERIOR CIRCULATION:   - Internal Carotid Arteries: Complete occlusion of the LEFT petrous, cavernous, clinoid, and ophthalmic ICA segments. Reconstitution of flow and normal caliber at the left communicating segment. 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 Intracranial right ICA widely patent with minimal calcific atherosclerosis.   - Middle Cerebral Arteries: Aneurysm clip at the left MCA bifurcation. The left M1 segment is patent. There is an occluded M2 left MCA branch as well as numerous left M3 branches in both superior and inferior divisions, both demonstrating a paucity of opacification when compared to the right side throughout the M 4 segments. On the right, no hemodynamically significant stenosis or occlusion.   - Anterior Cerebral Arteries:  No hemodynamically significant stenosis or occlusion.     POSTERIOR CIRCULATION:   - Intracranial Vertebral Arteries:  No hemodynamically significant stenosis or occlusion.   - Basilar Artery:  No hemodynamically significant stenosis or occlusion.   - Posterior Cerebral Arteries:  No hemodynamically significant stenosis or  occlusion.   No arteriovenous malformation is visualized. No pathologic intracranial enhancement or discrete mass. The dural venous sinuses are patent.   MIPS and 3D reconstructions confirm the above findings.       1. Left M1 segment is patent. There is an occluded M2 left MCA branch as well as numerous left M3 branches in both superior and inferior divisions, both demonstrating a paucity of opacification when compared to the right side throughout the M4 segments, particularly in the inferior division.   2. Complete occlusion of the left ICA from the bulb to the ophthalmic segment with reconstitution and normalization of caliber at the communicating segment. This is age indeterminate.   3. No evidence of intracranial aneurysm. 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   MACRO: Luis Merida discussed the significance and urgency of this critical finding by telephone with  KP ZAMARRIPA on 6/4/2024 at 12:25 a.m.. (**-RCF-**) Findings:  See findings.   Signed by: Luis Merida 6/4/2024 1:22 AM Dictation workstation:   [id number]    CT brain attack head wo IV contrast    Result Date: 6/4/2024  Interpreted By:  Luis Merida, STUDY: CT BRAIN ATTACK HEAD WO IV CONTRAST;  6/4/2024 12:05 am   INDICATION: Signs/Symptoms:Stroke Evaluation.   COMPARISON: None.   ACCESSION NUMBER(S): [id number]   ORDERING CLINICIAN: KP ZAMARRIPA   TECHNIQUE: Noncontrast axial CT images of head were obtained with coronal and sagittal reconstructed images.   FINDINGS: BRAIN PARENCHYMA: Evaluation is limited due to extensive motion artifact and beam hardening artifact from postsurgical changes in the left middle cranial fossa and suprasellar cistern. There is subtle hypoattenuation in the left frontal lobe involving the cortical gray white junction raising concern for early ischemic change. There is chronic gliotic changes within the left basal ganglia, left subinsular white matter, and  subcortical white matter of left parietal lobe. There is no acute intracranial hemorrhage or mass effect.   VENTRICLES and EXTRA-AXIAL SPACES: There is a hyperdense artery sign in the left sylvian fissure representing an M2/M3 vessel. Aneurysm clips adjacent to the anterior left clinoid process and in the left sylvian fissure are noted. No evidence of acute extra-axial hemorrhage. The ventricles are normal in caliber.   PARANASAL SINUSES/MASTOIDS:  No hemorrhage or air-fluid levels within the visualized paranasal sinuses. The mastoids are well aerated.   CALVARIUM/ORBITS: Left frontotemporal craniotomy. No skull fracture. The orbits and globes are intact to the extent visualized.   EXTRACRANIAL SOFT TISSUES: No discernible abnormality.       1. Hyperdense left M2/M3 vessel suspicious for an acute intraluminal thrombus. Concern for early loss of gray-white differentiation involving the left frontal lobe reflecting acute ischemic infarct.   2. No acute intracranial hemorrhage.   3. 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   4. Gliotic changes in the subinsular white matter and left basal ganglia.   MACRO: Luis Merida discussed the significance and urgency of this critical finding by [facility] with  KP ZAMARRIPA on 6/4/2024 at 12:29 am with confirmation of receipt .  (**-RCF-**) Findings:  See findings.   Signed by: Luis Merida 6/4/2024 12:32 AM Dictation workstation:   [id number]            Assessment/Plan   Principal Problem:    STEMI (ST elevation myocardial infarction) (Multi)      Non-small cell lung cancer: Currently receiving immunotherapy and radiation therapy  Oncology note reviewed  Acute pulmonary insufficiency: Currently in the setting of her encephalopathy and stroke.  No need for escalation.  Remains at risk for aspiration.  Trend  Aspiration precautions  Stroke/encephalopathy:  Neurology follow-up  Keppra initiated  DVT prophylaxis    No active  pulmonary needs at this time.  Will sign off.  Please reconsult as needed or call with any questions     LOS: 2 days       Pari De La Rosa MD  Pulmonary/Critical Care medicine

## 2024-06-06 NOTE — PROGRESS NOTES
Karen Garcia is a 74 y.o. female on day 2 of admission presenting with STEMI (ST elevation myocardial infarction) (Multi) and thrombotic stroke.      Subjective   Nonverbal.  Discussed with RN.  Patient does not answer questions.      Objective     Last Recorded Vitals  /77 (BP Location: Right arm, Patient Position: Lying)   Pulse 70   Temp 36.1 °C (97 °F) (Temporal)   Resp 14   Wt 78.2 kg (172 lb 6.4 oz)   SpO2 96%   Intake/Output last 3 Shifts:    Intake/Output Summary (Last 24 hours) at 6/6/2024 0850  Last data filed at 6/6/2024 0342  Gross per 24 hour   Intake --   Output 520 ml   Net -520 ml       Admission Weight  Weight: 83.1 kg (183 lb 3.2 oz) (06/04/24 0022)    Daily Weight  06/06/24 : 78.2 kg (172 lb 6.4 oz)  Constitutional:       General: She is awake. She is not in acute distress.  She does not look uncomfortable or in pain.  HENT:      Head: Normocephalic and atraumatic.   Eyes:      Conjunctiva/sclera: Conjunctivae normal.   Cardiovascular:      Rate and Rhythm: Normal rate and regular rhythm.      Pulses:           Radial pulses are 2+ on the right side and 2+ on the left side.        Posterior tibial pulses are 2+ on the right side and 2+ on the left side.   Pulmonary:      Effort: Pulmonary effort is normal.      Breath sounds: Normal breath sounds and air entry.   Abdominal:      General: Bowel sounds are normal.      Palpations: Abdomen is soft.   Genitourinary:     Comments: Knight catheter in place  Musculoskeletal:      Right lower leg: No edema.      Left lower leg: No edema.   Skin:     General: Skin is warm and dry.      Capillary Refill: Capillary refill takes less than 2 seconds.   Neurological:      GCS: GCS eye subscore is 4. GCS verbal subscore is 2. GCS motor subscore is 4.      Comments: Tongue deviates to right side, no movement to RUE, intermittent twitching to RLE     -LUE moves freely  -LLE withdraws to pain   Image Results  ECG 12 lead  Wide QRS rhythm  Nonspecific  intraventricular block  Cannot rule out Anteroseptal infarct , age undetermined  T wave abnormality, consider inferolateral ischemia  Abnormal ECG  When compared with ECG of 03-JUN-2024 23:43, (unconfirmed)  Previous ECG has undetermined rhythm, needs review  Confirmed by Kin Miller ([id number]) on 6/4/2024 3:15:35 PM  ECG 12 lead  Poor data quality, interpretation may be adversely affected  Undetermined rhythm  Anteroseptal infarct , age undetermined  Marked ST abnormality, possible inferior subendocardial injury  Abnormal ECG  When compared with ECG of 27-DEC-1996 15:11,  Significant changes have occurred  Confirmed by Kin Miller ([id number]) on 6/4/2024 3:14:43 PM  XR chest 1 view  Narrative: Interpreted By:  Augusta Stubbs,   STUDY:  XR CHEST 1 VIEW;  6/4/2024 12:03 pm      INDICATION:  Signs/Symptoms:ng placement.      COMPARISON:  06/04/2024 at 1:00 a.m.      ACCESSION NUMBER(S):  [id number]      ORDERING CLINICIAN:  TREVOR MANCIA      TECHNIQUE:  The      FINDINGS:  The cardiac silhouette appears prominent.      There is dense consolidation in the right midlung. There may also be  right pleural fluid.      A nasogastric tube terminates below the diaphragm. A venous catheter  on the left terminates in the region of the superior vena cava.      There are degenerative changes of the spine.      COMPARISON OF FINDING:  The nasogastric tube was placed in the interval.      Impression: Interval nasogastric tube placement. Dense consolidation on the right.      MACRO:  none      Signed by: Augusta Stubbs 6/4/2024 1:11 PM  Dictation workstation:   [id number]  Transthoracic Echo (TTE) [place] [address]             Phone [phone]    TRANSTHORACIC ECHOCARDIOGRAM REPORT       Patient Name:     KVNG DAVIDSON MAXIMO Marino Physician:   [id number] Dane Harding DO  Study Date:       6/4/2024              Ordering Provider:   [id number] LUIS PIKE  MRN/PID:          [id number]             Fellow:  Accession#:       [id number]         Nurse:  Date of           1950 / 74 years Sonographer:         Grayson Hooper RDCS  Birth/Age:  Gender:           F                    Additional Staff:  Height:           166.00 cm            Admit Date:  Weight:           78.00 kg             Admission Status:    Inpatient - Routine  BSA / BMI:        1.86 m2 / 28.31      Department Location: [facility]                    kg/m2  Blood Pressure: 135 /76 mmHg    Study Type:    TRANSTHORACIC ECHO (TTE) COMPLETE  Diagnosis/ICD: ST elevation (STEMI) myocardial infarction involving other                 sites-I21.29  Indication:    STEMI  CPT Codes:     Echo Complete w Full Doppler-[id number]    Patient History:  Pertinent History: COPD, CHF, CAD, HTN and Hyperlipidemia. Lung ca,STEMI.    Study Detail: The following Echo studies were performed: 2D, M-Mode, Doppler and                color flow. Technically challenging study due to poor acoustic                windows and patient lying in supine position.       PHYSICIAN INTERPRETATION:  Left Ventricle: Left ventricular systolic function is normal, with an estimated ejection fraction of 55-60%. There are no regional wall motion abnormalities. The left ventricular cavity size is normal. Left ventricular diastolic filling was indeterminate.  Left Atrium: The left atrium is normal in size.  Right Ventricle: The right ventricle is normal in size. There is normal right ventricular global systolic function.  Right Atrium: The right atrium was not well visualized.  Aortic Valve: The aortic valve is trileaflet. There is no evidence of aortic valve regurgitation. The peak instantaneous gradient of the aortic valve is 6.4 mmHg. The mean gradient of the aortic valve is 3.5 mmHg.  Mitral Valve: The mitral valve is normal in structure. There is no evidence of mitral valve regurgitation.  Tricuspid  Valve: The tricuspid valve was not well visualized. Tricuspid regurgitation was not assessed.  Pulmonic Valve: The pulmonic valve is not well visualized. The pulmonic valve regurgitation was not well visualized.  Pericardium: There is no pericardial effusion noted.  Aorta: The aortic root was not well visualized.       CONCLUSIONS:   1. Left ventricular systolic function is normal with a 55-60% estimated ejection fraction.   2. Poorly visualized anatomical structures due to suboptimal image quality.    QUANTITATIVE DATA SUMMARY:  2D MEASUREMENTS:                           Normal Ranges:  IVSd:          1.08 cm   (0.6-1.1cm)  LVPWd:         1.00 cm   (0.6-1.1cm)  LVIDd:         4.02 cm   (3.9-5.9cm)  LVIDs:         3.44 cm  LV Mass Index: 72.9 g/m2  LV % FS        14.6 %    LA VOLUME:                      Normal Ranges:  LA Vol A4C: 44.2 ml    RA VOLUME BY A/L METHOD:                        Normal Ranges:  RA Area A4C: 17.3 cm2    LV SYSTOLIC FUNCTION BY 2D PLANIMETRY (MOD):                      Normal Ranges:  EF-A4C View: 54.1 % (>=55%)  EF-A2C View: 42.6 %  EF-Biplane:  44.5 %    LV DIASTOLIC FUNCTION:                      Normal Ranges:  MV Peak E: 0.86 m/s (0.7-1.2 m/s)  MV Peak A: 0.02 m/s (0.42-0.7 m/s)  E/A Ratio: 41.95    (1.0-2.2)    MITRAL VALVE:                  Normal Ranges:  MV DT: 140 msec (150-240msec)    AORTIC VALVE:                                    Normal Ranges:  AoV Vmax:                1.27 m/s (<=1.7m/s)  AoV Peak P.4 mmHg (<20mmHg)  AoV Mean PG:             3.5 mmHg (1.7-11.5mmHg)  LVOT Max [place]:            0.64 m/s (<=1.1m/s)  AoV VTI:                 23.27 cm (18-25cm)  LVOT VTI:                13.24 cm  LVOT Diameter:           1.90 cm  (1.8-2.4cm)  AoV Area, VTI:           1.57 cm2 (2.5-5.5cm2)  AoV Area,Vmax:           1.39 cm2 (2.5-4.5cm2)  AoV Dimensionless Index: 0.57       RIGHT VENTRICLE:  RV Basal 3.80 cm  RV Mid   3.30 cm  RV Major 5.7 cm    TRICUSPID  VALVE/RVSP:                              Normal Ranges:  Peak TR Velocity: 1.98 m/s  RV Syst Pressure: 30.7 mmHg (< 30mmHg)  IVC Diam:         2.40 cm       [id number] Dane Harding DO  Electronically signed on 6/4/2024 at 1:10:18 PM       ** Final **  XR chest 1 view  Narrative: Interpreted By:  Luis Merida,   STUDY:  XR CHEST 1 VIEW;  6/4/2024 1:01 am      INDICATION:  Signs/Symptoms:co.      COMPARISON:  12/25/2022      ACCESSION NUMBER(S):  [id number]      ORDERING CLINICIAN:  KP ZAMARRIPA      FINDINGS:  There is a focal masslike consolidation infiltrate in the right  midlung measuring 4.2 cm x 5.5 cm. This demonstrates central lucency.  This is surrounded by more ill-defined airspace disease. The left  lung is clear. There is a left chest port terminating over the right  atrium. The heart is enlarged. Aorta is ectatic. No pneumothorax.      Impression: Focal masslike opacity projecting of the right midlung measuring 5.5  x 4.4 cm with a central lucency consistent with known lung cancer.          MACRO:  None.      Signed by: Luis Merida 6/4/2024 1:23 AM  Dictation workstation:   [id number]  CT brain attack angio head and neck W and WO IV contrast  Narrative: Interpreted By:  Luis Merida,   STUDY:  CT BRAIN ATTACK ANGIO HEAD AND NECK W AND WO IV CONTRAST;  6/4/2024  12:33 am      INDICATION:  Signs/Symptoms:droop, ams      ACCESSION NUMBER(S):  [id number]      ORDERING CLINICIAN:  KP ZAMARRIPA      TECHNIQUE:  Following IV contrast administration of 75 ML iodinated contrast, a  CT angiography of the head and neck was performed. MIPS and 3D  reconstructions of the [place] and neck were created on an  independent workstation and reviewed.      FINDINGS:  Emphysema at the lung apices. Moderate size right pleural effusion.          CTA NECK:              LEFT VERTEBRAL ARTERY: Mild atherosclerosis at the origin. No  hemodynamically significant stenosis, occlusion, or dissection.      LEFT  COMMON/INTERNAL CAROTID ARTERY: There is at least mild stenosis  of the left common carotid artery origin, evaluation limited due to  motion artifact. The remainder of the left common carotid artery is  widely patent. Just distal to the left carotid bulb there is complete  occlusion of the entire extracranial left ICA extending to the  intracranial the ICA.      RIGHT VERTEBRAL ARTERY: Non dominant. No hemodynamically significant  stenosis, occlusion, or dissection.      RIGHT COMMON/INTERNAL CAROTID ARTERY: Atherosclerosis at the bulb and  proximal postbulbar ICA. No hemodynamically significant stenosis,  occlusion, or dissection.          The neck soft tissues show no evidence of mass, fluid collection, or  enlarged lymph nodes. There is no acute osseous abnormality.              CTA HEAD:      ANTERIOR CIRCULATION:      - Internal Carotid Arteries: Complete occlusion of the LEFT petrous,  cavernous, clinoid, and ophthalmic ICA segments. Reconstitution of  flow and normal caliber at the left communicating segment. Aneurysm  clip at the left ophthalmic artery. Intracranial right ICA widely  patent with minimal calcific atherosclerosis.      - Middle Cerebral Arteries: Aneurysm clip at the left MCA  bifurcation. The left M1 segment is patent. There is an occluded M2  left MCA branch as well as numerous left M3 branches in both superior  and inferior divisions, both demonstrating a paucity of opacification  when compared to the right side throughout the M 4 segments. On the  right, no hemodynamically significant stenosis or occlusion.      - Anterior Cerebral Arteries:  No hemodynamically significant  stenosis or occlusion.          POSTERIOR CIRCULATION:      - Intracranial Vertebral Arteries:  No hemodynamically significant  stenosis or occlusion.      - Basilar Artery:  No hemodynamically significant stenosis or  occlusion.      - Posterior Cerebral Arteries:  No hemodynamically significant  stenosis or  occlusion.      No arteriovenous malformation is visualized.  No pathologic intracranial enhancement or discrete mass.  The dural venous sinuses are patent.      MIPS and 3D reconstructions confirm the above findings.      Impression: 1. Left M1 segment is patent. There is an occluded M2 left MCA branch  as well as numerous left M3 branches in both superior and inferior  divisions, both demonstrating a paucity of opacification when  compared to the right side throughout the M4 segments, particularly  in the inferior division.      2. Complete occlusion of the left ICA from the bulb to the ophthalmic  segment with reconstitution and normalization of caliber at the  communicating segment. This is age indeterminate.      3. No evidence of intracranial aneurysm. 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      MACRO:  Luis Merida discussed the significance and urgency of this  critical finding by telephone with  KP ZAMARRIPA on 6/4/2024 at 12:25  a.m.. (**-RCF-**) Findings:  See findings.      Signed by: Luis Merida 6/4/2024 1:22 AM  Dictation workstation:   [id number]  CT brain attack head wo IV contrast  Narrative: Interpreted By:  Luis Merida,   STUDY:  CT BRAIN ATTACK HEAD WO IV CONTRAST;  6/4/2024 12:05 am      INDICATION:  Signs/Symptoms:Stroke Evaluation.      COMPARISON:  None.      ACCESSION NUMBER(S):  [id number]      ORDERING CLINICIAN:  KP ZAMARRIPA      TECHNIQUE:  Noncontrast axial CT images of head were obtained with coronal and  sagittal reconstructed images.      FINDINGS:  BRAIN PARENCHYMA: Evaluation is limited due to extensive motion  artifact and beam hardening artifact from postsurgical changes in the  left middle cranial fossa and suprasellar cistern. There is subtle  hypoattenuation in the left frontal lobe involving the cortical gray  white junction raising concern for early ischemic change. There is  chronic gliotic changes within the  left basal ganglia, left  subinsular white matter, and subcortical white matter of left  parietal lobe. There is no acute intracranial hemorrhage or mass  effect.      VENTRICLES and EXTRA-AXIAL SPACES: There is a hyperdense artery sign  in the left sylvian fissure representing an M2/M3 vessel. Aneurysm  clips adjacent to the anterior left clinoid process and in the left  sylvian fissure are noted. No evidence of acute extra-axial  hemorrhage. The ventricles are normal in caliber.      PARANASAL SINUSES/MASTOIDS:  No hemorrhage or air-fluid levels within  the visualized paranasal sinuses. The mastoids are well aerated.      CALVARIUM/ORBITS: Left frontotemporal craniotomy. No skull fracture.  The orbits and globes are intact to the extent visualized.      EXTRACRANIAL SOFT TISSUES: No discernible abnormality.      Impression: 1. Hyperdense left M2/M3 vessel suspicious for an acute intraluminal  thrombus. Concern for early loss of gray-white differentiation  involving the left frontal lobe reflecting acute ischemic infarct.      2. No acute intracranial hemorrhage.      3. 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      4. Gliotic changes in the subinsular white matter and left basal  ganglia.      MACRO:  Luis Merida discussed the significance and urgency of this  critical finding by [facility] with  KP ZAMARRIPA on 6/4/2024 at 12:29  am with confirmation of receipt .  (**-F-**) Findings:  See  findings.      Signed by: Luis Merida 6/4/2024 12:32 AM  Dictation workstation:   [id number]      Physical Exam    Relevant Results               Assessment/Plan                  Principal Problem:    STEMI (ST elevation myocardial infarction) (Multi)  Essential hypertension  -anterior ST elevation noted on the EKG.   -taken to cardiac catheterization by cardiologist Dr. Harding.   -Mild non-obstructive CAD was noted, with no stent placement required.     Acute  thrombotic CVA  Hx of subarachnoid hemorrhage s/p repair in 1996   -On presentation aphasic and appeared to have a right sided facial droop   -CT head with hyperdense left M2/M3 vessel suspicious for an acute intraluminal thrombus, concern for early loss of gray-white differentiation involving the left frontal lobe reflecting acute ischemic infarct.   CT amgio head/neck with left M1 segment is patent. There is an occluded M2 left MCA branch as well as numerous left M3 branches in both superior and inferior divisions, both demonstrating a paucity of opacification when compared to the right side throughout the M4 segments, particularly in the inferior division. Complete occlusion of the left ICA from the bulb to the ophthalmic segment with reconstitution and normalization of caliber at the communicating segment.   -Telestroke consulted, and pt is not a candidate for tPA or endovascular treatment.   -Aspirin and IV heparin  -Physical, occupational, and speech therapy    COPD  Lung cancer on immunotherapy  Room air  DuoNebs    Dysphagia CVA  -Speech therapy  -Tube feeding    Transaminitis  -Holding statin    Ethics/Code Status:  -DNR CCA  I had a long conversation with the patient's  and son yesterday.  The  states that the patient has a living will and would like not to proceed with any heroic measures, artificial life support including tube feeding.  She does have advanced lung cancer on immunotherapy.  She cannot get MRI because of neurosurgical clips in mid 90s.  The patient's family would like to proceed with palliative care only and consult hospice.    Knight catheter    Discussed with cardiology and neurology.  Continue current supportive care until patient admitted to hospice.  The patient was slightly more agitated last night and required restraints.    Nicola Acevedo MD

## 2024-06-06 NOTE — PROGRESS NOTES
06/06/24 1051   Discharge Planning   Home or Post Acute Services Other (Comment)   Type of Post Acute Facility Services Other (Comment)   Patient expects to be discharged to: Referral sent to [facility] per request of spouse Carlos-following for meeting date     MSW spoke with pt spouse Carlos [phone] to discuss the request for Hospice per MD in morning rounds. He did not have a preferred provider and was agreeable to a referral being sent to [facility] to set up a meeting to discuss services.     Referral sent in [facility]. Will update once a meeting is scheduled.    [facility]Port Alert: YES response from [facility] re: Referral [id number] for patient in Unit: WES3E Bed: [id number] Facility, [facility]  Yes, willing to accept patient Thank you for referral. Will update once mtg scheduled       Safe dc plan not secured

## 2024-06-06 NOTE — PROGRESS NOTES
"Karen Garcia is a 74 y.o. female on day 2 of admission presenting with STEMI (ST elevation myocardial infarction) (Multi).    Subjective   Patient awake in bed. Appears in no acute distress. Remains aphasic.        Objective     Physical Exam  Constitutional:       General: She is not in acute distress.  Cardiovascular:      Rate and Rhythm: Normal rate. Rhythm irregular.      Heart sounds: No murmur heard.     No friction rub. No gallop.   Pulmonary:      Effort: Pulmonary effort is normal.      Breath sounds: Normal breath sounds. No wheezing, rhonchi or rales.   Abdominal:      General: Bowel sounds are normal.   Musculoskeletal:      Right lower leg: No edema.      Left lower leg: No edema.   Skin:     General: Skin is warm and dry.   Neurological:      Mental Status: She is alert.      Comments: Right sided weakness         Last Recorded Vitals  Blood pressure 126/77, pulse 70, temperature 36.1 °C (97 °F), temperature source Temporal, resp. rate 14, height 1.676 m (5' 6\"), weight 78.2 kg (172 lb 6.4 oz), SpO2 96%.  Intake/Output last 3 Shifts:  I/O last 3 completed shifts:  In: 100 (1.3 mL/kg) [IV Piggyback:100]  Out: 720 (9.2 mL/kg) [Urine:720 (0.3 mL/kg/hr)]  Weight: 78.2 kg     Relevant Results  Results for orders placed or performed during the hospital encounter of 06/03/24 (from the past 24 hour(s))   POCT GLUCOSE   Result Value Ref Range    POCT Glucose 112 (H) 74 - 99 mg/dL   POCT GLUCOSE   Result Value Ref Range    POCT Glucose 124 (H) 74 - 99 mg/dL   POCT GLUCOSE   Result Value Ref Range    POCT Glucose 109 (H) 74 - 99 mg/dL   POCT GLUCOSE   Result Value Ref Range    POCT Glucose 117 (H) 74 - 99 mg/dL   POCT GLUCOSE   Result Value Ref Range    POCT Glucose 116 (H) 74 - 99 mg/dL   Magnesium   Result Value Ref Range    Magnesium 2.70 1.60 - 3.10 mg/dL   Uric Acid   Result Value Ref Range    Uric Acid 11.1 (H) 2.5 - 6.8 mg/dL   Phosphorus   Result Value Ref Range    Phosphorus 4.9 (H) 2.5 - 4.5 mg/dL "   CBC and Auto Differential   Result Value Ref Range    WBC 14.9 (H) 4.4 - 11.3 x10*3/uL    nRBC 1.5 (H) 0.0 - 0.0 /100 WBCs    RBC 3.24 (L) 4.00 - 5.20 x10*6/uL    Hemoglobin 10.4 (L) 12.0 - 16.0 g/dL    Hematocrit 32.6 (L) 36.0 - 46.0 %     (H) 80 - 100 fL    MCH 32.1 26.0 - 34.0 pg    MCHC 31.9 (L) 32.0 - 36.0 g/dL    RDW 15.9 (H) 11.5 - 14.5 %    Platelets 181 150 - 450 x10*3/uL    Neutrophils % 89.2 40.0 - 80.0 %    Immature Granulocytes %, Automated 1.7 (H) 0.0 - 0.9 %    Lymphocytes % 2.7 13.0 - 44.0 %    Monocytes % 6.1 2.0 - 10.0 %    Eosinophils % 0.0 0.0 - 6.0 %    Basophils % 0.3 0.0 - 2.0 %    Neutrophils Absolute 13.32 (H) 1.60 - 5.50 x10*3/uL    Immature Granulocytes Absolute, Automated 0.25 0.00 - 0.50 x10*3/uL    Lymphocytes Absolute 0.41 (L) 0.80 - 3.00 x10*3/uL    Monocytes Absolute 0.91 (H) 0.05 - 0.80 x10*3/uL    Eosinophils Absolute 0.00 0.00 - 0.40 x10*3/uL    Basophils Absolute 0.05 0.00 - 0.10 x10*3/uL   Basic Metabolic Panel   Result Value Ref Range    Glucose 116 (H) 65 - 99 mg/dL    Sodium 149 (H) 133 - 145 mmol/L    Potassium 4.7 3.4 - 5.1 mmol/L    Chloride 110 (H) 97 - 107 mmol/L    Bicarbonate 22 (L) 24 - 31 mmol/L    Urea Nitrogen 56 (H) 8 - 25 mg/dL    Creatinine 0.90 0.40 - 1.60 mg/dL    eGFR 66 >60 mL/min/1.73m*2    Calcium 9.7 8.5 - 10.4 mg/dL    Anion Gap 17 <=19 mmol/L   POCT GLUCOSE   Result Value Ref Range    POCT Glucose 121 (H) 74 - 99 mg/dL                   Assessment/Plan   Principal Problem:    STEMI (ST elevation myocardial infarction) (Multi)    Anterior ST elevation  Clinical findings concerning for acute stroke  Myocardial injury  Non-small cell lung cancer        She presents with findings concerning for an acute stroke however her initial presentation is also concerning for myocardial infarction.  She does have anterior ST elevation which may or may not be associated with her interventricular conduction delay.  High-sensitivity troponin is 8000 indicating  Body Location Override (Optional - Billing Will Still Be Based On Selected Body Map Location If Applicable): left upper lip Detail Level: Detailed myocardial injury.  After discussing this case with neuroradiology who feels that she would be a candidate for anticoagulation and antiplatelet therapies if necessary we have decided to pursue diagnostic cardiac catheterization taking an extremely critical approach to what ever disease we may find.  The details of this procedure and this decision as well as the risks benefits and alternatives were discussed thoroughly with the family and they are agreeable.     6/5: Cardiac catheterization completed by Dr. Harding revealed mild nonobstructive coronary artery disease.  Patient was transitioned to the stepdown unit yesterday.  Echocardiogram completed yesterday revealed a preserved ejection fraction of 55 to 60% with no regional wall motion abnormalities.     6/6: Patient with increased agitation overnight requiring Valium and IV haldol. Blood pressures stable at 126/77. Patient is on room air with pulse oximetry of 96%. She is in a rate controlled atrial fibrillation on telemetry with frequent PVCs. Patient remains NPO at this time and is receiving tube feeds for nutrition. Per neurology note, discussion was had regarding patient's clinical condition and hospice consult was placed. We will continue to follow.         REGGIE Heath-CNP       Add 02049 Cpt? (Important Note: In 2017 The Use Of 40445 Is Being Tracked By Cms To Determine Future Global Period Reimbursement For Global Periods): yes No

## 2024-06-06 NOTE — NURSING NOTE
Assumed care of patient. Family at bedside. Patient is DNR-CC and has no restraints on. Patient is resting and is calm on a morphine drip.

## 2024-06-06 NOTE — PROGRESS NOTES
Spiritual Care Visit    Clinical Encounter Type  Visited With: Patient  Routine Visit: Follow-up  Continue Visiting: Yes         Values/Beliefs  Spiritual Requests During Hospitalization: [place] today     Eder Pendleton

## 2024-06-06 NOTE — CARE PLAN
The patient's goals for the shift include      The clinical goals for the shift include FRANKIE      Problem: Skin  Goal: Decreased wound size/increased tissue granulation at next dressing change  Outcome: Progressing  Flowsheets (Taken 6/6/2024 0847)  Decreased wound size/increased tissue granulation at next dressing change: Promote sleep for wound healing  Goal: Participates in plan/prevention/treatment measures  Outcome: Progressing  Flowsheets (Taken 6/6/2024 0847)  Participates in plan/prevention/treatment measures: Elevate heels  Goal: Prevent/manage excess moisture  Outcome: Progressing  Flowsheets (Taken 6/6/2024 0847)  Prevent/manage excess moisture: Moisturize dry skin  Goal: Prevent/minimize sheer/friction injuries  Outcome: Progressing  Flowsheets (Taken 6/6/2024 0847)  Prevent/minimize sheer/friction injuries: Use pull sheet  Goal: Promote/optimize nutrition  Outcome: Progressing  Flowsheets (Taken 6/6/2024 0847)  Promote/optimize nutrition: Assist with feeding  Goal: Promote skin healing  Outcome: Progressing  Flowsheets (Taken 6/6/2024 0847)  Promote skin healing: Assess skin/pad under line(s)/device(s)     Problem: Pain  Goal: Takes deep breaths with improved pain control throughout the shift  Outcome: Progressing  Goal: Turns in bed with improved pain control throughout the shift  Outcome: Progressing  Goal: Walks with improved pain control throughout the shift  Outcome: Progressing  Goal: Performs ADL's with improved pain control throughout shift  Outcome: Progressing  Goal: Participates in PT with improved pain control throughout the shift  Outcome: Progressing  Goal: Free from opioid side effects throughout the shift  Outcome: Progressing  Goal: Free from acute confusion related to pain meds throughout the shift  Outcome: Progressing     Problem: Safety - Medical Restraint  Goal: Remains free of injury from restraints (Restraint for Interference with Medical Device)  Outcome: Progressing  Goal: Free  from restraint(s) (Restraint for Interference with Medical Device)  Outcome: Progressing     Problem: Pain - Adult  Goal: Verbalizes/displays adequate comfort level or baseline comfort level  Outcome: Progressing     Problem: Safety - Adult  Goal: Free from fall injury  Outcome: Progressing     Problem: Discharge Planning  Goal: Discharge to home or other facility with appropriate resources  Outcome: Progressing     Problem: Chronic Conditions and Co-morbidities  Goal: Patient's chronic conditions and co-morbidity symptoms are monitored and maintained or improved  Outcome: Progressing

## 2024-06-06 NOTE — SIGNIFICANT EVENT
1300: I presented to bedside as an I-team was called on this patient. Patient's telemetry monitor revealed sustained ventricular tachycardia with rates in the 190s. Rosey Yuan CNP with palliative medicine was at the bedside as well as the rapid response nurse and several stepdown RNs. Patient was visibly uncomfortable, moaning and diaphoretic. Patient had been made a DNR comfort measures only this morning per primary team after discussion with family. Palliative CNP recommending amiodarone bolus to provide relieve from discomfort related to ventricular tachycardia as well as 2 mg morphine every 15 minutes for chest pain. I placed these orders as well as a consult for palliative medicine.     1315: Rosey Yuan CNP called the patient's family to inform them of the patient's change in clinical status and family stated they are on their way.    1330: Family at the bedside. End of life medication orders being placed by palliative care.       06/06/24 at 2:14 PM - REGGIE Heath-CNP

## 2024-06-06 NOTE — PROGRESS NOTES
Spiritual Care Visit    Clinical Encounter Type  Visited With: Patient and family together  Routine Visit: Follow-up  Continue Visiting: Yes         Values/Beliefs  Spiritual Requests During Hospitalization: Last Rites for Karen with family at her bedside     Eder Pednleton

## 2024-06-06 NOTE — NURSING NOTE
UPDATE 0000: Patient shouting out, removing left hand mitt. Trying to remove gown and telemetry leads. Notified CNP on call. 1X dose for Valium ordered    UPDATE: 0245: Patient shouting out, trying to swing left side extremities over side of bed. Tried repositioning, swabbing patient mouth and reorienting without success after 1 dose Valium being administered . Updated CNP- IV Haldo ordered

## 2024-06-06 NOTE — PROGRESS NOTES
Physical Therapy                 Therapy Communication Note    Patient Name: Karen Garcia  MRN: [id number]  Today's Date: 6/6/2024     Discipline: Physical Therapy    Missed Visit Reason: Missed Visit Reason: Cancel, Other (Comment) (cancel per RN, not appropriate for therapy this date.  received haldol.)    Missed Time: Cancel

## 2024-06-06 NOTE — PROGRESS NOTES
Speech-Language Pathology                 Therapy Communication Note    Patient Name: Karen Garcia  MRN: [id number]  Today's Date: 6/6/2024     Discipline: Speech Language Pathology    Missed Visit Reason:  Pt is agitated, in bilateral wrist restraints, and mitts, unable to participate in reassessment /tx, will attempt this afternoon as appropriate, discussed with RN    Missed Time: Attempt    Comment:

## 2024-06-06 NOTE — CONSULTS
Reason For Consult  Lung cancer    History Of Present Illness  Karen Garcia is a 74 y.o. female presenting with STEMI and CVA. She was dx with stage 3 lung adenocarcinoma s/p concurrent chemoRT with carboplatin/taxol between 02/13/24- 04/15/24 followed by Imfinzi maintenance, Cycle 3 was planned on 06/17/24.    CT chest at [facility] oin 04/24 showed slight interval increase in size of right perihilar and RML mass 5.5 x4.2 cm compared to 5.4 x3.8 cm and increased size of subcarinal node.     She has complete right sided weakness. Story obtained from chart and family members.     Past Medical History  She has a past medical history of COPD (chronic obstructive pulmonary disease) (Multi).    Surgical History  She has a past surgical history that includes Cardiac catheterization (N/A, 6/4/2024) and Cardiac catheterization (N/A, 6/4/2024).     Social History  She reports that she has never smoked. She has never been exposed to tobacco smoke. She has never used smokeless tobacco. She reports that she does not drink alcohol and does not use drugs.    Family History  No family history on file.     Allergies  Patient has no known allergies.    Review of Systems  Unable to obtain     Physical Exam  Constitutional:       General: She is awake.      Comments: confused   HENT:      Head: Normocephalic and atraumatic.      Mouth/Throat:      Mouth: Mucous membranes are moist.   Eyes:      General: No scleral icterus.     Conjunctiva/sclera: Conjunctivae normal.   Cardiovascular:      Rate and Rhythm: Rhythm irregular.      Pulses: Normal pulses.   Pulmonary:      Effort: Pulmonary effort is normal. No respiratory distress.      Breath sounds: Normal breath sounds. No wheezing.   Abdominal:      Palpations: Abdomen is soft.   Musculoskeletal:      Right lower leg: No edema.      Left lower leg: No edema.   Skin:     General: Skin is warm and dry.   Neurological:      Mental Status: She is disoriented.      Comments:  "Probable right-sided weakness            Last Recorded Vitals  Blood pressure 145/67, pulse 99, temperature 36 °C (96.8 °F), resp. rate 20, height 1.676 m (5' 6\"), weight 78.2 kg (172 lb 6.4 oz), SpO2 92%.    Relevant Results  labs are reviewed     Assessment/Plan     1- Lung cancer:  She was dx with stage 3 lung adenocarcinoma, [id number] mutated, PDL1 %10 s/p concurrent chemoRT with carboplatin/taxol between 02/13/24- 04/15/24 followed by Imfinzi maintenance, Cycle 3 was planned on 06/17/24.    CT chest at [facility] oin 04/24 showed slight interval increase in size of right perihilar and RML mass 5.5 x4.2 cm compared to 5.4 x 3.8 cm and increased size of subcarinal node. Se now presents with acute CVA and STEMI.     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    She was on immunotherapy with Imfinzi which can cause myocarditis/pericarditis/pericardial effusion/conduction abnormalities/arrythmias which may have caused Afib and embolic stroke is but STEMI is likely unrelated to immunotherapy. Due to her current status, Imfinzi will need to be held until she recovers enough and will likely never be restarted.    Although CT chest from 04/24 showed slight increased size of RML and perihilar mass and subcarinal node, it may be too early to assess the response to chemoRT. Would need to restage after mid July to better assess the response.     MRI brain and MRA are pending. Neurology and Cardiology consultations are placed and appreciate their input.    No acute intervention needed from oncology standpoint at this time. Eliquis 5 mg po BID plus ASA 81 mg is recommended    I spent 60 minutes in the professional and overall care of this patient.      Marsha Coombs MD    "

## 2024-06-06 NOTE — PROGRESS NOTES
This patient was DNR comfort care.  I was called by the nurse to examine the patient after she .  She  at 7:45 PM today.  I examined the patient and found her to be completely unresponsive to any stimuli.  No breath sounds no heart sounds.  I am declaring her dead as of 7:45 PM  Primo Graves MD

## 2024-06-06 NOTE — PROGRESS NOTES
Occupational Therapy                 Therapy Communication Note    Patient Name: Karen Garcia  MRN: [id number]  Today's Date: 6/6/2024     Discipline: Occupational Therapy    Missed Visit Reason: Missed Visit Reason: Cancel (hold tx per RN-pt not appropriate for therapy this date, received haldol and is combative)    Missed Time: Cancel    Comment:

## 2024-06-07 LAB
ATRIAL RATE: 208 BPM
Q ONSET: 171 MS
QRS COUNT: 35 BEATS
QRS DURATION: 174 MS
QT INTERVAL: 286 MS
QTC CALCULATION(BAZETT): 534 MS
QTC FREDERICIA: 434 MS
R AXIS: 90 DEGREES
T AXIS: 220 DEGREES
T OFFSET: 314 MS
VENTRICULAR RATE: 210 BPM

## 2024-06-09 NOTE — DISCHARGE SUMMARY
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  Expressive aphasia  Dysphagia  Right dense hemiparesis  Acute ST elevation myocardial infarction  Ventricular tachycardia  Hypernatremia  Hyperuricemia  Anemia of malignancy [chronic disease]    Issues Requiring Follow-Up  Patient     Discharge Meds     Your medication list        ASK your doctor about these medications        Instructions Last Dose Given Next Dose Due   amLODIPine 2.5 mg tablet  Commonly known as: Norvasc           atorvastatin 10 mg tablet  Commonly known as: Lipitor           benzonatate 100 mg capsule  Commonly known as: Tessalon           gabapentin 300 mg capsule  Commonly known as: Neurontin           ipratropium-albuteroL 0.5-2.5 mg/3 mL nebulizer solution  Commonly known as: Duo-Neb                    Test Results Pending At Discharge  Pending Labs       No current pending labs.            Hospital Course   Patient patient presented to the hospital with acute stroke and myocardial infarction.  Cardiac catheterization did not show significant coronary artery disease.  She had significant neurological deficit from the stroke with complete expressive aphasia and right-sided dense hemiparesis.  She does have underlying stage IV lung cancer treated with immunotherapy.  Per  she has a living wish and did not want any aggressive life supportive measures.  The patient was made comfort care only DO NOT RESUSCITATE.  Patient  with dignity on 2024 at 7:45 PM.  Pertinent Physical Exam At Time of Discharge  Physical Exam    Outpatient Follow-Up  No future appointments.    On the date of discharge I spent more than 55 minutes with the patient, family, palliative care and cardiology services discussed    Nicola Acevedo MD

## 2024-06-21 NOTE — SIGNIFICANT EVENT
Restraint:  Death within 24 hours of use of bilateral mitts. CMS form 33863 completed and submitted as per CMS requirements on 6/21/24.

## 2024-08-13 NOTE — NURSING NOTE
[employer] [facility] called back and stated that the patient was not a coroners case and isnot a donor. I spoke with Gloria.   5

## (undated) DEVICE — GUIDEWIRE, J TIP, 3 MM, 0.035 IN X 260 CM, PTFE

## (undated) DEVICE — TR BAND, RADIAL COMPRESSION, STANDARD, 24CM

## (undated) DEVICE — POSITIONER, RETENTION SYSTEM, PANNUS, 2 PADS 1 STRAP, NS

## (undated) DEVICE — PACK, ANGIO P2, CUSTOM, LAKE

## (undated) DEVICE — GLIDESHEATH, SLENDER 6FR, 10CM, NITINOL KIT

## (undated) DEVICE — INFLATION KIT, ADVANTAGE, ENCORE 26 (1/BOX)

## (undated) DEVICE — KIT, NAMIC STANDARD LEFT HEART, CUSTOM, LWMC

## (undated) DEVICE — GLIDEWIRE, ANGLE, STANDARD, .035 X 180CM, 1.5MM J-TIP

## (undated) DEVICE — Device